# Patient Record
Sex: FEMALE | Race: WHITE | NOT HISPANIC OR LATINO | Employment: FULL TIME | URBAN - METROPOLITAN AREA
[De-identification: names, ages, dates, MRNs, and addresses within clinical notes are randomized per-mention and may not be internally consistent; named-entity substitution may affect disease eponyms.]

---

## 2023-04-18 ENCOUNTER — OFFICE VISIT (OUTPATIENT)
Dept: INTERNAL MEDICINE | Facility: CLINIC | Age: 38
End: 2023-04-18
Payer: COMMERCIAL

## 2023-04-18 VITALS
BODY MASS INDEX: 44.41 KG/M2 | RESPIRATION RATE: 16 BRPM | DIASTOLIC BLOOD PRESSURE: 86 MMHG | TEMPERATURE: 98.7 F | WEIGHT: 293 LBS | OXYGEN SATURATION: 98 % | SYSTOLIC BLOOD PRESSURE: 136 MMHG | HEIGHT: 68 IN | HEART RATE: 86 BPM

## 2023-04-18 DIAGNOSIS — E66.01 MORBID OBESITY WITH BMI OF 40.0-44.9, ADULT: ICD-10-CM

## 2023-04-18 DIAGNOSIS — F33.9 RECURRENT MAJOR DEPRESSIVE DISORDER, REMISSION STATUS UNSPECIFIED: Primary | ICD-10-CM

## 2023-04-18 RX ORDER — BUPROPION HYDROCHLORIDE 150 MG/1
150 TABLET ORAL DAILY
Qty: 30 TABLET | Refills: 5 | Status: SHIPPED | OUTPATIENT
Start: 2023-04-18

## 2023-04-18 RX ORDER — SEMAGLUTIDE 0.25 MG/.5ML
0.25 INJECTION, SOLUTION SUBCUTANEOUS WEEKLY
Qty: 2 ML | Refills: 0 | Status: SHIPPED | OUTPATIENT
Start: 2023-04-18

## 2023-04-18 NOTE — PROGRESS NOTES
Chief Complaint  Establish Care, Med Refill (Needs refills of Bupropion  MG, Metformin  MG/Getting IUD removed in  with hopes of getting pregnant), and Weight Loss (Discuss weight loss/Was going to start Wegovy .25 MG x 4 weeks(hasn't started yet))    Subjective        Alice Adams presents to [facility]  History of Present Illness  Here to establish care.     We discussed the followin. Depression - has bee on wellbutrin 150mg XL since post partum, some improvement in symptoms. Does have episodes of binging. No noted side effects  2. 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 Ongoing challenges with weight management. No personal or family history of thyroid cancer.       Current Outpatient Medications:   •  buPROPion XL (Wellbutrin XL) 150 MG 24 hr tablet, Take 1 tablet by mouth Daily., Disp: 30 tablet, Rfl: 5  •  Prenatal Vit-DSS-Fe Cbn-FA (PRENATAL MULTIVITAMIN-ULTRA PO), Take 1 tablet by mouth Daily., Disp: , Rfl:   •  Semaglutide-Weight Management (Wegovy) 0.25 MG/0.5ML solution auto-injector, Inject 0.25 mg under the skin into the appropriate area as directed 1 (One) Time Per Week., Disp: 2 mL, Rfl: 0  Past Medical History:   Diagnosis Date   • ADHD (attention deficit hyperactivity disorder)    • Depression    • History of medical problems    • Hypertension    • Obesity      History reviewed. No pertinent surgical history.  No family history on file.  Social History     Socioeconomic History   • Marital status:    Tobacco Use   • Smoking status: Never   • Smokeless tobacco: Never   Vaping Use   • Vaping Use: Never used   Substance and Sexual Activity   • Alcohol use: Yes   • Drug use: Never        reviewed and updated    Objective   Vital Signs:  /86    "Pulse 86   Temp 98.7 °F (37.1 °C)   Resp 16   Ht 172.7 cm (68\")   Wt 134 kg (295 lb 9.6 oz)   SpO2 98%   BMI 44.95 kg/m²   Estimated body mass index is 44.95 kg/m² as calculated from the following:    Height as of this encounter: 172.7 cm (68\").    Weight as of this encounter: 134 kg (295 lb 9.6 oz).    Class 3 Severe Obesity (BMI >=40). Obesity-related health conditions include the following: none. Obesity is unchanged. BMI is is above average; BMI management plan is completed. We discussed low calorie, low carb based diet program, portion control, increasing exercise, management of depression/anxiety/stress to control compensatory eating and pharmacologic options including glp1 agonist.      Physical Exam  Vitals and nursing note reviewed.   Constitutional:       General: She is not in acute distress.     Appearance: Normal appearance. She is not ill-appearing.   HENT:      Head: Normocephalic and atraumatic.      Right Ear: External ear normal.      Left Ear: External ear normal.   Eyes:      General:         Right eye: No discharge.         Left eye: No discharge.      Extraocular Movements: Extraocular movements intact.      Conjunctiva/sclera: Conjunctivae normal.      Pupils: Pupils are equal, round, and reactive to light.   Pulmonary:      Effort: Pulmonary effort is normal.   Neurological:      General: No focal deficit present.      Mental Status: She is alert and oriented to person, place, and time. Mental status is at baseline.      Cranial Nerves: No cranial nerve deficit.   Psychiatric:         Mood and Affect: Mood normal.         Behavior: Behavior normal.         Thought Content: Thought content normal.        Result Review :  The following data was reviewed by: Toño Vaughan MD on 04/18/2023:  Common labs        6/9/2022    12:29   Common Labs   Albumin 4.1        Total Bilirubin 0.4        Alkaline Phosphatase 96        AST (SGOT) 17        ALT (SGPT) 11        WBC 8.67        Hemoglobin 12.9   "      Hematocrit 41.2        Platelets 295            This result is from an external source.     Data reviewed: prior office notes reviewed          Assessment and Plan      Alice Adams is a 37 y.o. female presenting to [facility] care. MDD Stable on wellbutrin. Ongoing challenges with weight, discussed treatment options, risks/benefits/side effects/mechanism etc. Continue monitoring intake, increasing physical activity. F/u 4 weeks for dose titration and weight check.     Diagnoses and all orders for this visit:    1. Recurrent major depressive disorder, remission status unspecified (Primary)  -     buPROPion XL (Wellbutrin XL) 150 MG 24 hr tablet; Take 1 tablet by mouth Daily.  Dispense: 30 tablet; Refill: 5    2. Morbid obesity with BMI of 40.0-44.9, adult  -     Semaglutide-Weight Management (Wegovy) 0.25 MG/0.5ML solution auto-injector; Inject 0.25 mg under the skin into the appropriate area as directed 1 (One) Time Per Week.  Dispense: 2 mL; Refill: 0           I spent 20 minutes caring for Alice on this date of service. This time includes time spent by me in the following activities:preparing for the visit, reviewing tests, obtaining and/or reviewing a separately obtained history, performing a medically appropriate examination and/or evaluation , counseling and educating the patient/family/caregiver, ordering medications, tests, or procedures and documenting information in the medical record  Follow Up   Return in about 4 weeks (around 5/16/2023) for Recheck.  Patient was given instructions and counseling regarding her condition or for health maintenance advice. Please see specific information pulled into the AVS if appropriate.     Toño Vaughan MD  [facility]

## 2023-05-16 ENCOUNTER — TELEPHONE (OUTPATIENT)
Dept: INTERNAL MEDICINE | Facility: CLINIC | Age: 38
End: 2023-05-16

## 2023-05-16 NOTE — TELEPHONE ENCOUNTER
Caller: Alice Adams    Relationship: Self    Best call back number: [phone]    What medication are you requesting: INCREASED DOSAGE OF Semaglutide-Weight Management (Wegovy)    If a prescription is needed, what is your preferred pharmacy and phone number: [facility] [address] - [phone]  - [phone]     Additional notes: PATIENT TOOK LAST 0.25 MG INJECTION OF THE WEGOVY YESTERDAY. PATIENT STATES THAT SHE IS DOING OK WITH SIDE EFFECTS, ONLY COMPLAINT IS SOME NAUSEA.

## 2023-05-17 DIAGNOSIS — E66.01 MORBID OBESITY WITH BMI OF 40.0-44.9, ADULT: Primary | ICD-10-CM

## 2023-05-17 RX ORDER — SEMAGLUTIDE 0.5 MG/.5ML
0.5 INJECTION, SOLUTION SUBCUTANEOUS WEEKLY
Qty: 2 ML | Refills: 0 | Status: SHIPPED | OUTPATIENT
Start: 2023-05-17 | End: 2023-05-22

## 2023-05-22 ENCOUNTER — TELEPHONE (OUTPATIENT)
Dept: INTERNAL MEDICINE | Facility: CLINIC | Age: 38
End: 2023-05-22

## 2023-05-22 RX ORDER — SEMAGLUTIDE 0.25 MG/.5ML
0.25 INJECTION, SOLUTION SUBCUTANEOUS WEEKLY
Qty: 2 ML | Refills: 0 | Status: SHIPPED | OUTPATIENT
Start: 2023-05-22

## 2023-05-22 NOTE — TELEPHONE ENCOUNTER
Caller: Alice Adams    Relationship: Self    Best call back number: [phone]    Who are you requesting to speak with (clinical staff, provider,  specific staff member): DR. NORMAN    What was the call regarding: Semaglutide-Weight Management (Wegovy) 0.5 MG/0.5ML solution auto-injector  IS ON BACKORDER AT PHARMACY ASKING IF SHE CAN BE PRESCRIBED A DIFFERENT STRENGTH THEY HAVE 0.25 OR 0.75 MG IN STOCK     Do you require a callback: YES

## 2023-06-21 ENCOUNTER — TELEPHONE (OUTPATIENT)
Dept: INTERNAL MEDICINE | Facility: CLINIC | Age: 38
End: 2023-06-21

## 2023-06-21 NOTE — TELEPHONE ENCOUNTER
Caller: Alice Adams    Relationship: Self    Best call back number: [phone]     What is the best time to reach you: ANYTIME     Who are you requesting to speak with (clinical staff, provider,  specific staff member): CLINICAL    What was the call regarding: PATIENT IS CALLING IN WITH RIGHT KNEE PAIN. SHE WENT TO SQUAT DOWN AND FELT RIGHT KNEE POP AND SINCE THEN WHENEVER SHE BENDS THE KNEE SHE HAS STABBING PAIN GO THROUGH IT.     PATIENT IS ASKING IF DR NOMRAN WOULD WANT TO SEE HER OR SEND HER TO ORTHOPEDIC FOR FURTHER TREATMENT.     PLEASE CALL TO ADVISE.     Is it okay if the provider responds through MyChart: YES

## 2023-08-08 ENCOUNTER — TREATMENT (OUTPATIENT)
Dept: PHYSICAL THERAPY | Facility: CLINIC | Age: 38
End: 2023-08-08
Payer: COMMERCIAL

## 2023-08-08 DIAGNOSIS — R29.898 WEAKNESS OF RIGHT LOWER EXTREMITY: ICD-10-CM

## 2023-08-08 DIAGNOSIS — M25.561 ACUTE PAIN OF RIGHT KNEE: Primary | ICD-10-CM

## 2023-08-08 PROCEDURE — 97110 THERAPEUTIC EXERCISES: CPT | Performed by: PHYSICAL THERAPIST

## 2023-08-08 PROCEDURE — 97112 NEUROMUSCULAR REEDUCATION: CPT | Performed by: PHYSICAL THERAPIST

## 2023-08-08 PROCEDURE — 97116 GAIT TRAINING THERAPY: CPT | Performed by: PHYSICAL THERAPIST

## 2023-08-08 PROCEDURE — 97530 THERAPEUTIC ACTIVITIES: CPT | Performed by: PHYSICAL THERAPIST

## 2023-08-08 NOTE — PROGRESS NOTES
[facility] [place]  [address] [place] [address]  Phone [phone]  Fax [phone]      Physical Therapy Progress NOTE      Patient: Alice Adams   : 1985  Referring practitioner: Toño Vaughan MD  Date of Initial Visit: Type: THERAPY  Noted: 2023  Today's Date: 2023  Patient seen for 3 sessions including ROM, strengthening, and patient education.       Visit Diagnoses:    ICD-10-CM ICD-9-CM   1. Acute pain of right knee  M25.561 719.46   2. Weakness of right lower extremity  R29.898 729.89       Alice Adams reports: had 3  episodes of sharp pian with full flexion and lunge position. Overall no other issues with twisting.  Still feels like I'm babying it going down stairs.    Subjective     Objective          Observations     Right Knee   Negative for deformity and edema.     Additional Knee Observation Details       Tenderness     Right Knee   Tenderness in the patellar tendon.     Additional Tenderness Details  Mild inferior patella    Neurological Testing     Sensation     Knee   Left Knee   Intact: Light touch    Right Knee   Intact: light touch     Active Range of Motion   Left Knee   Flexion: 119 degrees   Extension: Left knee active extension: +3.     Right Knee   Flexion: 119 degrees   Extension: Right knee active extension: +1 hyperextension.     Passive Range of Motion   Left Knee   Flexion: 121 degrees   Extension: Left knee passive extension: +3.     Right Knee   Flexion: 120 degrees     Patellar Mobility   Left Knee Patellar tendons within functional limits include the medial, lateral, superior and inferior.     Right Knee Patellar tendons within functional limits include the medial and lateral. Hypermobile in the superior and inferior patellar tendon(s).     Additional Patellar Mobility Details  Slight hypermobility inferior and superior directions.    Strength/Myotome Testing     Left Hip   Planes of Motion   Flexion: 5  Extension:  "5  Abduction: 5  Adduction: 4    Right Hip   Planes of Motion   Flexion: 5  Extension: 5  Abduction: 5  Adduction: 4+    Left Knee   Flexion: 5  Extension: 5  Quadriceps contraction: good    Right Knee   Flexion: 4+  Prone flexion: 4+  Extension: 4 (5/5 in extension, 30 degrees, 4/5 in full flexion)  Quadriceps contraction: good    Tests     Right Knee   Negative anterior Lachman, Apley's compression, Apley's distraction, lateral Donald, medial Donald, patellar apprehension, patellar compression, patella-femoral grind, posterior Lachman, valgus stress test at 0 degrees, valgus stress test at 30 degrees, varus stress test at 0 degrees and varus stress test at 30 degrees.     Swelling     Left Knee Girth Measurement (cm)   Joint line: no.    Right Knee Girth Measurement (cm)   Joint line: no significant.    Ambulation     Quality of Movement During Gait     Knee    Knee (Left): Positive recurvatum.   Knee (Right): Positive recurvatum and valgus.     Functional Assessment   Squat No pain.     Single Leg Squat     Right Leg  Valgus.     Forward Step Up 8\"     Right Leg  No pain.     Forward Step Down 6\"     Right Leg  Pain and valgus.     Lunge   Left Leg  Within functional limits.     Right Leg  Pain.     Comments  Improved control in single leg stance but hyperextension.    See Exercise, Manual, and Modality Logs for complete treatment.   Access Code: [email or web address]  URL: [email or web address]  Date: 08/08/2023  Prepared by: Felicita Funes    Exercises  - Clam with Resistance  - 1 x daily - 7 x weekly - 1 sets - 15 reps - 3 hold  - Sidelying Hip Adduction with Ankle Weight - Leg Behind  - 1 x daily - 7 x weekly - 1 sets - 15 reps - - hold  - Supine Bridge with Mini Swiss Ball Between Knees  - 1 x daily - 7 x weekly - 1 sets - 10 reps - 10 hold  - Seated Quad Set  - 1 x daily - 7 x weekly - 2 sets - 10 reps - 10 hold  - Seated Long Arc Quad with Ankle Weight  - 1 x daily - 3 x weekly - 1 sets - 15 reps - 5 hold  - Hip " Abduction with Resistance Loop  - 1 x daily - 3 x weekly - 2 sets - 10 reps  - Seated Hamstring Curl with Anchored Resistance  - 1 x daily - 3 x weekly - 2 sets - 10 reps  - Alternating Single Leg Balance - Foot Behind  - 1 x daily - 3 x weekly - 3 sets - 1 reps - 30 hold  - Sit to Stand  - 1 x daily - 3 x weekly - 2 sets - 10 reps  - Forward Step Up  - 1 x daily - 3 x weekly - 2 sets - 10 reps  - Lateral Step Up with Counter Support  - 1 x daily - 3 x weekly - 3 sets - 10 reps  - Forward Step Down  - 1 x daily - 3 x weekly - 1 sets - 10 reps  - Single Leg Heel Raise with Counter Support  - 1 x daily - 3 x weekly - 1 sets - 10 reps    Assessment/Plan  Patient presents with decreased pain and instability overall however, she still has a sharp pain with a lunge/kneeling position when she bent down at home.  Side to side movements are no longer an issue but she still is apprehensive with descending the stairs.  She has full ROM and improved hip and knee strength.  Her weakness is now mostly in the end ranges of flexion and in weightbearing positions.  Tendon is mildly tender but appears intact with pain only recreated in extreme flexion with weightbearing.  We were able to progress exercises to more functional weightbearing with good tolerance and no pain.  She should still avoid positions that cause sharp pain and we will slowly progress as able.  If sharp pain persists after continued strengthening, we will refer her back to her physician.    Plan Goals: 3 weeks MET  1. Pt to tolerate initial HEP  2. Pt to exhibit increased right knee AROM to full to allow for improved ability to navigate curbs and stairs  3. Pt to report decreased pain with squat by 25%.  4. Pt to exhibit increased right strength by 1/5 to allow for prolonged ADL's and walking  8 weeks   1.  Patient will demonstrate full right knee ROM in open and closed chain. NOT MET  2.  Patient will demonstrate 5/5 strength bilateral knee and hip.  Progressing  3.  Functionally patient will be able to ascend/descend stairs, squat, and balance on one foot with no giving way, controlled movement, and  tolerable symptoms. Progressing  4.  Patient will be able to return to walking with tolerable symptoms. NOT MET    Timed:         Manual Therapy:    -     mins  57871;     Therapeutic Exercise:    15     mins  24026;     Neuromuscular Raul:    10    mins  70868;    Therapeutic Activity:     10     mins  83478;     Gait Training:      10   mins  34938;     Ultrasound:     -     mins  21989;    Ionto                               -    mins   52608  Self Care                       -     mins   13424  Orthotic training    -     mins 67257      Un-Timed:  Electrical Stimulation:    -     mins  60650 ( );  Dry Needling     -     mins self-pay  Traction     -     mins 35465      Timed Treatment:   50   mins   Total Treatment:     60   mins    Felicita Funes, PT, OCS, CIDN  [place] License: [id number]

## 2023-08-23 ENCOUNTER — TREATMENT (OUTPATIENT)
Dept: PHYSICAL THERAPY | Facility: CLINIC | Age: 38
End: 2023-08-23
Payer: COMMERCIAL

## 2023-08-23 DIAGNOSIS — M25.561 ACUTE PAIN OF RIGHT KNEE: Primary | ICD-10-CM

## 2023-08-23 DIAGNOSIS — R29.898 WEAKNESS OF RIGHT LOWER EXTREMITY: ICD-10-CM

## 2023-08-23 PROCEDURE — 97530 THERAPEUTIC ACTIVITIES: CPT | Performed by: PHYSICAL THERAPIST

## 2023-08-23 PROCEDURE — 97110 THERAPEUTIC EXERCISES: CPT | Performed by: PHYSICAL THERAPIST

## 2023-08-23 PROCEDURE — 97112 NEUROMUSCULAR REEDUCATION: CPT | Performed by: PHYSICAL THERAPIST

## 2023-08-23 NOTE — PROGRESS NOTES
[facility] [place]  [address] [place] [address]  Phone [phone]  Fax [phone]      Physical Therapy Daily Treatment Note      Patient: Alice Adams   : 1985  Referring practitioner: Toño Vaughan MD  Date of Initial Visit: Type: THERAPY  Noted: 2023  Today's Date: 2023  Patient seen for 4 sessions       Visit Diagnoses:    ICD-10-CM ICD-9-CM   1. Acute pain of right knee  M25.561 719.46   2. Weakness of right lower extremity  R29.898 729.89       Alice Adams reports: less chance to exercise the last week.  Did notice once incident of sharp pain with deep bend.  Pain is at the bottom of the knee cap.  No instability that she noticed.    Subjective     Objective   See Exercise, Manual, and Modality Logs for complete treatment.   No palpable tenderness or edema noted.  Improved step up and down on 8 inches with rotational control.  Full supine and prone knee flexion without pain.  Strength quad 4-/5 midrange to endrange 3+/5    Assessment/Plan  Patient presents with improved strength and control in both nonweightbearing and weightbearing positions.  Eccentric control has definitely improved with stepdown.  One episode of pain at end range weightbearing flexion appears just inferior patellar ligament with no indication of mechanical dysfunction of joint and ligament is intact.  Continue strengthening, may be ready for HEP next visit.    Timed:         Manual Therapy:    -     mins  74932;     Therapeutic Exercise:    15     mins  05160;     Neuromuscular Raul:    8   mins  85361;    Therapeutic Activity:     12     mins  82964;     Gait Training:      -     mins  26243;     Ultrasound:     -     mins  21056;    Ionto                               -    mins   71935  Self Care                       -     mins   56708  Orthotic training    -     mins 26806      Un-Timed:  Electrical Stimulation:    -     mins  29819 ( );  Dry Needling     -      mins self-pay  Traction     -     mins 90200      Timed Treatment:   35   mins   Total Treatment:     40  mins    Felicita Funes PT, CIDN  [place] License: [id number]

## 2024-03-19 ENCOUNTER — HOSPITAL ENCOUNTER (EMERGENCY)
Facility: HOSPITAL | Age: 39
Discharge: LEFT BEFORE DISCHARGE | End: 2024-03-19
Attending: EMERGENCY MEDICINE | Admitting: EMERGENCY MEDICINE
Payer: COMMERCIAL

## 2024-03-19 ENCOUNTER — HOSPITAL ENCOUNTER (EMERGENCY)
Facility: HOSPITAL | Age: 39
Discharge: HOME | End: 2024-03-19
Attending: EMERGENCY MEDICINE | Admitting: EMERGENCY MEDICINE
Payer: COMMERCIAL

## 2024-03-19 VITALS
RESPIRATION RATE: 16 BRPM | HEART RATE: 84 BPM | SYSTOLIC BLOOD PRESSURE: 145 MMHG | DIASTOLIC BLOOD PRESSURE: 90 MMHG | OXYGEN SATURATION: 97 % | TEMPERATURE: 98.4 F | WEIGHT: 293 LBS | HEIGHT: 68 IN | BODY MASS INDEX: 44.41 KG/M2

## 2024-03-19 VITALS
BODY MASS INDEX: 44.41 KG/M2 | SYSTOLIC BLOOD PRESSURE: 148 MMHG | WEIGHT: 293 LBS | HEIGHT: 68 IN | DIASTOLIC BLOOD PRESSURE: 99 MMHG | OXYGEN SATURATION: 96 % | RESPIRATION RATE: 18 BRPM | TEMPERATURE: 98.4 F | HEART RATE: 90 BPM

## 2024-03-19 DIAGNOSIS — I10 HYPERTENSION, UNSPECIFIED TYPE: Primary | ICD-10-CM

## 2024-03-19 DIAGNOSIS — R19.7 NAUSEA VOMITING AND DIARRHEA: ICD-10-CM

## 2024-03-19 DIAGNOSIS — R11.2 NAUSEA VOMITING AND DIARRHEA: ICD-10-CM

## 2024-03-19 DIAGNOSIS — N30.01 ACUTE CYSTITIS WITH HEMATURIA: ICD-10-CM

## 2024-03-19 PROBLEM — F98.8 ADD (ATTENTION DEFICIT DISORDER): Status: ACTIVE | Noted: 2017-02-14

## 2024-03-19 PROBLEM — Z97.5 IUD (INTRAUTERINE DEVICE) IN PLACE: Status: ACTIVE | Noted: 2021-01-13

## 2024-03-19 PROBLEM — Z82.79 FAMILY HISTORY OF SPINA BIFIDA: Status: ACTIVE | Noted: 2017-02-14

## 2024-03-19 PROBLEM — Z86.59 HISTORY OF DEPRESSION: Status: ACTIVE | Noted: 2017-02-14

## 2024-03-19 PROBLEM — O14.93 PRE-ECLAMPSIA IN THIRD TRIMESTER: Status: ACTIVE | Noted: 2020-10-28

## 2024-03-19 PROBLEM — Z80.3 FAMILY HISTORY OF BREAST CANCER: Status: ACTIVE | Noted: 2017-02-14

## 2024-03-19 PROBLEM — Z83.3 FAMILY HISTORY OF DIABETES MELLITUS: Status: ACTIVE | Noted: 2017-02-14

## 2024-03-19 LAB
ALBUMIN SERPL-MCNC: 4 G/DL (ref 3.5–5.2)
ALBUMIN/GLOB SERPL: 1.4 G/DL
ALP SERPL-CCNC: 114 U/L (ref 39–117)
ALT SERPL W P-5'-P-CCNC: 23 U/L (ref 1–33)
ANION GAP SERPL CALCULATED.3IONS-SCNC: 13 MMOL/L (ref 5–15)
AST SERPL-CCNC: 18 U/L (ref 1–32)
BACTERIA UR QL AUTO: ABNORMAL /HPF
BASOPHILS # BLD AUTO: 0.03 10*3/MM3 (ref 0–0.2)
BASOPHILS NFR BLD AUTO: 0.3 % (ref 0–1.5)
BILIRUB SERPL-MCNC: 0.2 MG/DL (ref 0–1.2)
BILIRUB UR QL STRIP: NEGATIVE
BUN SERPL-MCNC: 14 MG/DL (ref 6–20)
BUN/CREAT SERPL: 18.4 (ref 7–25)
CALCIUM SPEC-SCNC: 9 MG/DL (ref 8.6–10.5)
CHLORIDE SERPL-SCNC: 100 MMOL/L (ref 98–107)
CLARITY UR: ABNORMAL
CO2 SERPL-SCNC: 25 MMOL/L (ref 22–29)
COLOR UR: YELLOW
CREAT SERPL-MCNC: 0.76 MG/DL (ref 0.57–1)
DEPRECATED RDW RBC AUTO: 44.2 FL (ref 37–54)
EGFRCR SERPLBLD CKD-EPI 2021: 104.3 ML/MIN/1.73
EOSINOPHIL # BLD AUTO: 0.15 10*3/MM3 (ref 0–0.4)
EOSINOPHIL NFR BLD AUTO: 1.6 % (ref 0.3–6.2)
ERYTHROCYTE [DISTWIDTH] IN BLOOD BY AUTOMATED COUNT: 14.5 % (ref 12.3–15.4)
GLOBULIN UR ELPH-MCNC: 2.9 GM/DL
GLUCOSE SERPL-MCNC: 98 MG/DL (ref 65–99)
GLUCOSE UR STRIP-MCNC: NEGATIVE MG/DL
HCG SERPL QL: NEGATIVE
HCT VFR BLD AUTO: 44.9 % (ref 34–46.6)
HGB BLD-MCNC: 14.5 G/DL (ref 12–15.9)
HGB UR QL STRIP.AUTO: ABNORMAL
HOLD SPECIMEN: NORMAL
HYALINE CASTS UR QL AUTO: ABNORMAL /LPF
IMM GRANULOCYTES # BLD AUTO: 0.05 10*3/MM3 (ref 0–0.05)
IMM GRANULOCYTES NFR BLD AUTO: 0.5 % (ref 0–0.5)
KETONES UR QL STRIP: NEGATIVE
LEUKOCYTE ESTERASE UR QL STRIP.AUTO: ABNORMAL
LIPASE SERPL-CCNC: 31 U/L (ref 13–60)
LYMPHOCYTES # BLD AUTO: 1.56 10*3/MM3 (ref 0.7–3.1)
LYMPHOCYTES NFR BLD AUTO: 16.7 % (ref 19.6–45.3)
MAGNESIUM SERPL-MCNC: 1.9 MG/DL (ref 1.6–2.6)
MCH RBC QN AUTO: 27.3 PG (ref 26.6–33)
MCHC RBC AUTO-ENTMCNC: 32.3 G/DL (ref 31.5–35.7)
MCV RBC AUTO: 84.6 FL (ref 79–97)
MONOCYTES # BLD AUTO: 0.54 10*3/MM3 (ref 0.1–0.9)
MONOCYTES NFR BLD AUTO: 5.8 % (ref 5–12)
NEUTROPHILS NFR BLD AUTO: 6.99 10*3/MM3 (ref 1.7–7)
NEUTROPHILS NFR BLD AUTO: 75.1 % (ref 42.7–76)
NITRITE UR QL STRIP: NEGATIVE
NRBC BLD AUTO-RTO: 0 /100 WBC (ref 0–0.2)
PH UR STRIP.AUTO: 5.5 [PH] (ref 4.5–8)
PHOSPHATE SERPL-MCNC: 3.4 MG/DL (ref 2.5–4.5)
PLATELET # BLD AUTO: 315 10*3/MM3 (ref 140–450)
PMV BLD AUTO: 10.8 FL (ref 6–12)
POTASSIUM SERPL-SCNC: 3.7 MMOL/L (ref 3.5–5.2)
PROT SERPL-MCNC: 6.9 G/DL (ref 6–8.5)
PROT UR QL STRIP: NEGATIVE
RBC # BLD AUTO: 5.31 10*6/MM3 (ref 3.77–5.28)
RBC # UR STRIP: ABNORMAL /HPF
REF LAB TEST METHOD: ABNORMAL
SODIUM SERPL-SCNC: 138 MMOL/L (ref 136–145)
SP GR UR STRIP: 1.03 (ref 1–1.03)
SQUAMOUS #/AREA URNS HPF: ABNORMAL /HPF
T4 FREE SERPL-MCNC: 1.11 NG/DL (ref 0.93–1.7)
TSH SERPL DL<=0.05 MIU/L-ACNC: 0.95 UIU/ML (ref 0.27–4.2)
UROBILINOGEN UR QL STRIP: ABNORMAL
WBC # UR STRIP: ABNORMAL /HPF
WBC NRBC COR # BLD AUTO: 9.32 10*3/MM3 (ref 3.4–10.8)

## 2024-03-19 PROCEDURE — 81001 URINALYSIS AUTO W/SCOPE: CPT | Performed by: NURSE PRACTITIONER

## 2024-03-19 PROCEDURE — 99283 EMERGENCY DEPT VISIT LOW MDM: CPT

## 2024-03-19 PROCEDURE — 84100 ASSAY OF PHOSPHORUS: CPT | Performed by: NURSE PRACTITIONER

## 2024-03-19 PROCEDURE — 83735 ASSAY OF MAGNESIUM: CPT | Performed by: NURSE PRACTITIONER

## 2024-03-19 PROCEDURE — 83690 ASSAY OF LIPASE: CPT | Performed by: NURSE PRACTITIONER

## 2024-03-19 PROCEDURE — 84703 CHORIONIC GONADOTROPIN ASSAY: CPT | Performed by: NURSE PRACTITIONER

## 2024-03-19 PROCEDURE — 80050 GENERAL HEALTH PANEL: CPT | Performed by: NURSE PRACTITIONER

## 2024-03-19 PROCEDURE — 84439 ASSAY OF FREE THYROXINE: CPT | Performed by: NURSE PRACTITIONER

## 2024-03-19 PROCEDURE — 87086 URINE CULTURE/COLONY COUNT: CPT | Performed by: NURSE PRACTITIONER

## 2024-03-19 RX ORDER — NITROFURANTOIN 25; 75 MG/1; MG/1
100 CAPSULE ORAL 2 TIMES DAILY
Qty: 14 CAPSULE | Refills: 0 | Status: SHIPPED | OUTPATIENT
Start: 2024-03-19 | End: 2024-03-26

## 2024-03-19 RX ORDER — ONDANSETRON 4 MG/1
4 TABLET, ORALLY DISINTEGRATING ORAL 4 TIMES DAILY PRN
Qty: 12 TABLET | Refills: 0 | Status: SHIPPED | OUTPATIENT
Start: 2024-03-19

## 2024-03-19 RX ORDER — LISINOPRIL 10 MG/1
10 TABLET ORAL DAILY
Qty: 30 TABLET | Refills: 0 | Status: SHIPPED | OUTPATIENT
Start: 2024-03-19 | End: 2024-04-18

## 2024-03-19 NOTE — DISCHARGE INSTRUCTIONS
Rest and increase fluids.  Follow-up with your primary care.  Take medications as directed.  Return to the emergency department symptoms get worse or change.

## 2024-03-19 NOTE — ED PROVIDER NOTES
"Subjective   History of Present Illness  38-year-old  morbidly obese female patient presented to the emergency department via private vehicle with a chief complaint of hypertension.  Patient states that she took her blood pressure at home and it was elevated.  She states she went to urgent care and they evaluated her and sent her to the emergency department.  She states that she does have a history of elevated blood pressure but it was in the context of her pregnancy in 2020.  She states she did have preeclampsia and delivered approximately 1-1/2-week early.  She states she did have some issues with her blood pressure after delivery but it did finally \"settle down\".  Patient states she is having some neck pain and diarrhea.  She states overall she does not feel \"well\".    History provided by:  Medical records and patient      Review of Systems   Constitutional: Negative.    HENT: Negative.     Respiratory: Negative.     Cardiovascular: Negative.    Gastrointestinal:  Positive for diarrhea.   Genitourinary: Negative.    Musculoskeletal:  Positive for neck pain.   Skin: Negative.    Neurological: Negative.    Psychiatric/Behavioral: Negative.         Past Medical History:   Diagnosis Date    ADHD (attention deficit hyperactivity disorder)     Anemia 2017 and 2020    postpartum    Depression     Gestational hypertension 2017 and 2020    History of medical problems     Hypertension     Migraine 2008 occasional since    Obesity     PMS (premenstrual syndrome) normal    Pre-eclampsia in third trimester 10/28/2020    Urinary tract infection 2005 constantly    Varicella as a child very severe       No Known Allergies    Past Surgical History:   Procedure Laterality Date    WISDOM TOOTH EXTRACTION  2003       Family History   Problem Relation Age of Onset    Breast cancer Mother         BRCA -    Melanoma Father     Stroke Father         has had 2 strokes    Diabetes Father     Hypertension Father     Stroke " Maternal Grandfather         I believe he  of a stroke    Diabetes Maternal Grandfather     Diabetes Maternal Grandmother     Prostate cancer Paternal Grandfather     Diabetes Paternal Grandfather     Hypertension Paternal Grandfather     Ovarian cancer Paternal Grandmother     Breast cancer Paternal Aunt         BRCA +    Breast cancer Paternal Aunt         BRCA +    Uterine cancer Paternal Aunt     Hypertension Paternal Uncle        Social History     Socioeconomic History    Marital status:    Tobacco Use    Smoking status: Never     Passive exposure: Never    Smokeless tobacco: Never   Vaping Use    Vaping status: Never Used   Substance and Sexual Activity    Alcohol use: Not Currently    Drug use: Never    Sexual activity: Yes     Partners: Male     Birth control/protection: I.U.D.           Objective   Physical Exam  Vitals and nursing note reviewed.   Constitutional:       General: She is awake. She is not in acute distress.     Appearance: Normal appearance. She is morbidly obese. She is not ill-appearing, toxic-appearing or diaphoretic.   HENT:      Head: Normocephalic and atraumatic.      Right Ear: External ear normal.      Left Ear: External ear normal.      Nose: Nose normal.      Mouth/Throat:      Mouth: Mucous membranes are moist.      Pharynx: Oropharynx is clear.   Eyes:      Extraocular Movements: Extraocular movements intact.      Conjunctiva/sclera: Conjunctivae normal.      Pupils: Pupils are equal, round, and reactive to light.   Cardiovascular:      Rate and Rhythm: Normal rate and regular rhythm.      Pulses: Normal pulses.      Heart sounds: Normal heart sounds.   Pulmonary:      Effort: Pulmonary effort is normal.      Breath sounds: Normal breath sounds.   Abdominal:      General: Bowel sounds are normal.      Palpations: Abdomen is soft.   Musculoskeletal:         General: Normal range of motion.      Cervical back: Normal range of motion and neck supple.   Skin:     General:  Skin is warm and dry.      Capillary Refill: Capillary refill takes less than 2 seconds.   Neurological:      General: No focal deficit present.      Mental Status: She is alert and oriented to person, place, and time.   Psychiatric:         Mood and Affect: Mood normal.         Behavior: Behavior normal. Behavior is cooperative.         Thought Content: Thought content normal.         Judgment: Judgment normal.   Procedures           ED Course                                             Medical Decision Making  Differential diagnosis includes essential hypertension, hypertensive crisis, electrolyte abnormalities, renal dysfunction, congestive heart failure and gastroenteritis.    Results for orders placed or performed during the hospital encounter of 03/19/24  -[place] Urine Culture Tube - Urine, Clean Catch:   Specimen: Urine, Clean Catch       Result                      Value             Ref Range           Extra Tube                                                    Hold for add-ons.  -Urinalysis, Microscopic Only - Urine, Clean Catch:   Specimen: Urine, Clean Catch       Result                      Value             Ref Range           RBC, UA                     3-5 (A)           None Seen, 0*       WBC, UA                     6-10 (A)          None Seen, 0*       Bacteria, UA                3+ (A)            None Seen /H*       Squamous Epithelial Ce*     13-20 (A)         None Seen, 0*       Hyaline Casts, UA           None Seen         None Seen /L*       Methodology                                                   Manual Light Microscopy  -Urinalysis With Microscopic If Indicated (No Culture) - Urine, Clean Catch:   Specimen: Urine, Clean Catch       Result                      Value             Ref Range           Color, UA                   Yellow            Yellow, Straw       Appearance, UA                                Clear           Slightly Cloudy (A)       pH, UA                      5.5                4.5 - 8.0           Specific Economy, UA        1.030             1.003 - 1.030       Glucose, UA                 Negative          Negative            Ketones, UA                 Negative          Negative            Bilirubin, UA               Negative          Negative            Blood, UA                   Trace (A)         Negative            Protein, UA                 Negative          Negative            Leuk Esterase, UA           Trace (A)         Negative            Nitrite, UA                 Negative          Negative            Urobilinogen, UA            0.2 E.U./dL       0.2 - 1.0 E.*  Results for orders placed or performed during the hospital encounter of 03/19/24  -CBC Auto Differential:   Specimen: Blood       Result                      Value             Ref Range           WBC                         9.32              3.40 - 10.80*       RBC                         5.31 (H)          3.77 - 5.28 *       Hemoglobin                  14.5              12.0 - 15.9 *       Hematocrit                  44.9              34.0 - 46.6 %       MCV                         84.6              79.0 - 97.0 *       MCH                         27.3              26.6 - 33.0 *       MCHC                        32.3              31.5 - 35.7 *       RDW                         14.5              12.3 - 15.4 %       RDW-SD                      44.2              37.0 - 54.0 *       MPV                         10.8              6.0 - 12.0 fL       Platelets                   315               140 - 450 10*       Neutrophil %                75.1              42.7 - 76.0 %       Lymphocyte %                16.7 (L)          19.6 - 45.3 %       Monocyte %                  5.8               5.0 - 12.0 %        Eosinophil %                1.6               0.3 - 6.2 %         Basophil %                  0.3               0.0 - 1.5 %         Immature Grans %            0.5               0.0 - 0.5 %         Neutrophils,  Absolute       6.99              1.70 - 7.00 *       Lymphocytes, Absolute       1.56              0.70 - 3.10 *       Monocytes, Absolute         0.54              0.10 - 0.90 *       Eosinophils, Absolute       0.15              0.00 - 0.40 *       Basophils, Absolute         0.03              0.00 - 0.20 *       Immature Grans, Absolu*     0.05              0.00 - 0.05 *       nRBC                        0.0               0.0 - 0.2 /1*  -hCG, Serum, Qualitative:   Specimen: Blood       Result                      Value             Ref Range           HCG Qualitative             Negative          Negative       -TSH:   Specimen: Blood       Result                      Value             Ref Range           TSH                         0.954             0.270 - 4.20*  -T4, Free:   Specimen: Blood       Result                      Value             Ref Range           Free T4                     1.11              0.93 - 1.70 *  -Phosphorus:   Specimen: Blood       Result                      Value             Ref Range           Phosphorus                  3.4               2.5 - 4.5 mg*  -Magnesium:   Specimen: Blood       Result                      Value             Ref Range           Magnesium                   1.9               1.6 - 2.6 mg*  -Lipase:   Specimen: Blood       Result                      Value             Ref Range           Lipase                      31                13 - 60 U/L    -Comprehensive Metabolic Panel:   Specimen: Blood       Result                      Value             Ref Range           Glucose                     98                65 - 99 mg/dL       BUN                         14                6 - 20 mg/dL        Creatinine                  0.76              0.57 - 1.00 *       Sodium                      138               136 - 145 mm*       Potassium                   3.7               3.5 - 5.2 mm*       Chloride                    100               98 - 107 mmo*       CO2                          25.0              22.0 - 29.0 *       Calcium                     9.0               8.6 - 10.5 m*       Total Protein               6.9               6.0 - 8.5 g/*       Albumin                     4.0               3.5 - 5.2 g/*       ALT (SGPT)                  23                1 - 33 U/L          AST (SGOT)                  18                1 - 32 U/L          Alkaline Phosphatase        114               39 - 117 U/L        Total Bilirubin             0.2               0.0 - 1.2 mg*       Globulin                    2.9               gm/dL               A/G Ratio                   1.4               g/dL                BUN/Creatinine Ratio        18.4              7.0 - 25.0          Anion Gap                   13.0              5.0 - 15.0 m*       eGFR                        104.3             >60.0 mL/min*  Results for orders placed or performed in visit on 07/11/23  -IGP, Apt HPV,rfx 16 / 18,45:   Specimen: Cervix; ThinPrep Vial       Result                      Value             Ref Range           Diagnosis                   Comment                               Specimen adequacy:          Comment                               Clinician Provided ICD*     Comment                               Performed by:               Comment                               .                           .                                     Note:                       Comment                               Method:                     Comment                               HPV Aptima                  Negative          Negative       -POC Pregnancy, Urine:   Specimen: Urine       Result                      Value             Ref Range           HCG, Urine, QL              Negative          Negative            Lot Number                  [id number]                            Internal Positive Cont*     Passed            Positive, Pa*       Internal Negative Cont*     Passed            Negative, Pa*        Expiration Date             01/31/24                         -POC Urinalysis Dipstick:   Specimen: Urine       Result                      Value             Ref Range           Color                       Yellow            Yellow, Stra*       Clarity, UA                 Clear             Clear               Glucose, UA                 Negative          Negative mg/*       Bilirubin                   Negative          Negative            Ketones, UA                 Negative          Negative            Specific Gravity            1.005             1.005 - 1.030       Blood, UA                   Negative          Negative            pH, Urine                   5.0               5.0 - 8.0           Protein, POC                Negative          Negative mg/*       Urobilinogen, UA            Normal            Normal, 0.2 *       Leukocytes                  Negative          Negative            Nitrite, UA                 Negative          Negative       Results for orders placed or performed during the hospital encounter of 12/30/22  -Covid-19 + Flu A&B AG, Veritor [id number]:   Specimen: Swab       Result                      Value             Ref Range           COVID19                     Not Detected                          Influenza A Antigen HOLLY     Not Detected                          Influenza B Antigen HOLLY     Not Detected                          Internal Control            Passed                                Lot Number                  [id number]                             Expiration Date             [id number],[id number]                           Discussed laboratory findings with patient.  Patient has hypertension.  Laboratory studies are all negative.  Patient appears to have an acute cystitis with hematuria as well.  We will send in Macrobid 100 mg by mouth twice daily, lisinopril 10 mg by mouth daily and Zofran 4 mg every 6 hours as needed.  She was instructed to follow-up with her primary care.  Patient  understands and agrees to discharge plan.    Problems Addressed:  Acute cystitis with hematuria: acute illness or injury  Hypertension, unspecified type: acute illness or injury  Nausea vomiting and diarrhea: acute illness or injury    Amount and/or Complexity of Data Reviewed  External Data Reviewed: labs, radiology and notes.  Labs: ordered. Decision-making details documented in ED Course.    Risk  Prescription drug management.        Final diagnoses:   Hypertension, unspecified type   Acute cystitis with hematuria   Nausea vomiting and diarrhea       ED Disposition  ED Disposition       ED Disposition   Discharge    Condition   Stable    Comment   --               Toño Vaughan MD  [address]  [facility] [address] [phone]    Schedule an appointment as soon as possible for a visit in 1 week      [facility] EMERGENCY DEPARTMENT  [address]   [place] [address] [phone]    If symptoms worsen         Medication List        New Prescriptions      lisinopril 10 MG tablet  Commonly known as: PRINIVIL,ZESTRIL  Take 1 tablet by mouth Daily for 30 days.     nitrofurantoin (macrocrystal-monohydrate) 100 MG capsule  Commonly known as: MACROBID  Take 1 capsule by mouth 2 (Two) Times a Day for 7 days.     ondansetron ODT 4 MG disintegrating tablet  Commonly known as: ZOFRAN-ODT  Place 1 tablet on the tongue 4 (Four) Times a Day As Needed for Nausea or Vomiting.               Where to Get Your Medications        These medications were sent to [id number] - [place] - [address] - [phone]  - [phone] FX  [address] [place] [address]      Phone: [phone]   lisinopril 10 MG tablet  nitrofurantoin (macrocrystal-monohydrate) 100 MG capsule  ondansetron ODT 4 MG disintegrating tablet            Aj Jenkins, APRN  03/19/24 2660

## 2024-03-19 NOTE — ED NOTES
Pt states she has no one to help p/u her pre-schooler and has to leave to pick her up by 1700 and will come back.

## 2024-03-19 NOTE — ED PROVIDER NOTES
"Subjective   History of Present Illness  38-year-old  morbidly obese female patient presented to the emergency department via private vehicle with a chief complaint of hypertension.  Patient states that she took her blood pressure at home and it was elevated.  She states she went to urgent care and they evaluated her and sent her to the emergency department.  She states that she does have a history of elevated blood pressure but it was in the context of her pregnancy in 2020.  She states she did have preeclampsia and delivered approximately 1-1/2-week early.  She states she did have some issues with her blood pressure after delivery but it did finally \"settle down\".  Patient states she is having some neck pain and diarrhea.  She states overall she does not feel \"well\".    History provided by:  Medical records and patient      Review of Systems   Constitutional: Negative.    HENT: Negative.     Eyes: Negative.    Respiratory: Negative.     Cardiovascular: Negative.    Gastrointestinal:  Positive for diarrhea. Negative for abdominal distention, abdominal pain, anal bleeding, blood in stool, constipation, nausea, rectal pain and vomiting.   Genitourinary: Negative.    Musculoskeletal:  Positive for neck pain. Negative for arthralgias, back pain, gait problem, joint swelling, myalgias and neck stiffness.   Skin: Negative.    Neurological: Negative.    Psychiatric/Behavioral: Negative.     All other systems reviewed and are negative.      Past Medical History:   Diagnosis Date    ADHD (attention deficit hyperactivity disorder)     Anemia 2017 and 2020    postpartum    Depression     Gestational hypertension 2017 and 2020    History of medical problems     Hypertension     Migraine 2008 occasional since    Obesity     PMS (premenstrual syndrome) normal    Pre-eclampsia in third trimester 10/28/2020    Urinary tract infection 2005 constantly    Varicella as a child very severe       No Known Allergies    Past " Surgical History:   Procedure Laterality Date    WISDOM TOOTH EXTRACTION         Family History   Problem Relation Age of Onset    Breast cancer Mother         BRCA -    Melanoma Father     Stroke Father         has had 2 strokes    Diabetes Father     Hypertension Father     Stroke Maternal Grandfather         I believe he  of a stroke    Diabetes Maternal Grandfather     Diabetes Maternal Grandmother     Prostate cancer Paternal Grandfather     Diabetes Paternal Grandfather     Hypertension Paternal Grandfather     Ovarian cancer Paternal Grandmother     Breast cancer Paternal Aunt         BRCA +    Breast cancer Paternal Aunt         BRCA +    Uterine cancer Paternal Aunt     Hypertension Paternal Uncle        Social History     Socioeconomic History    Marital status:    Tobacco Use    Smoking status: Never     Passive exposure: Never    Smokeless tobacco: Never   Vaping Use    Vaping status: Never Used   Substance and Sexual Activity    Alcohol use: Not Currently    Drug use: Never    Sexual activity: Yes     Partners: Male     Birth control/protection: I.U.D.           Objective   Physical Exam  Vitals and nursing note reviewed.   Constitutional:       General: She is awake. She is not in acute distress.     Appearance: Normal appearance. She is morbidly obese. She is not ill-appearing, toxic-appearing or diaphoretic.   HENT:      Head: Normocephalic and atraumatic.      Right Ear: External ear normal.      Left Ear: External ear normal.      Nose: Nose normal.      Mouth/Throat:      Mouth: Mucous membranes are moist.      Pharynx: Oropharynx is clear.   Eyes:      Extraocular Movements: Extraocular movements intact.      Conjunctiva/sclera: Conjunctivae normal.      Pupils: Pupils are equal, round, and reactive to light.   Cardiovascular:      Rate and Rhythm: Normal rate and regular rhythm.      Pulses: Normal pulses.      Heart sounds: Normal heart sounds.   Pulmonary:      Effort: Pulmonary  effort is normal.      Breath sounds: Normal breath sounds.   Abdominal:      General: Bowel sounds are normal.      Palpations: Abdomen is soft.   Musculoskeletal:         General: Normal range of motion.      Cervical back: Normal range of motion and neck supple.   Skin:     General: Skin is warm and dry.      Capillary Refill: Capillary refill takes less than 2 seconds.   Neurological:      General: No focal deficit present.      Mental Status: She is alert and oriented to person, place, and time.   Psychiatric:         Mood and Affect: Mood normal.         Behavior: Behavior normal. Behavior is cooperative.         Thought Content: Thought content normal.         Judgment: Judgment normal.         Procedures           ED Course                                             Medical Decision Making  Differential diagnosis includes essential hypertension, hypertensive crisis, electrolyte abnormalities, renal dysfunction, congestive heart failure and gastroenteritis.    Problems Addressed:  Hypertension, unspecified type: complicated acute illness or injury    Amount and/or Complexity of Data Reviewed  Labs: ordered. Decision-making details documented in ED Course.        Final diagnoses:   None       ED Disposition  ED Disposition       None            No follow-up provider specified.       Medication List      No changes were made to your prescriptions during this visit.            Aj Jenkins, APRN  03/19/24 1930

## 2024-03-20 ENCOUNTER — PATIENT ROUNDING (BHMG ONLY) (OUTPATIENT)
Dept: URGENT CARE | Facility: CLINIC | Age: 39
End: 2024-03-20
Payer: COMMERCIAL

## 2024-03-20 NOTE — ED NOTES
Thank you for letting us care for you in your recent visit to our urgent care center. We would love to hear about your experience with us. Was this the first time you have visited our location?    We’re always looking for ways to make our patients’ experiences even better. Do you have any recommendations on ways we may improve?     I appreciate you taking the time to respond. Please be on the lookout for a survey about your recent visit from [employer] via text or email. We would greatly appreciate if you could fill that out and turn it back in. We want your voice to be heard and we value your feedback.   Thank you for choosing [facility] for your healthcare needs.

## 2024-03-21 LAB — BACTERIA SPEC AEROBE CULT: NORMAL

## 2024-03-30 ENCOUNTER — HOSPITAL ENCOUNTER (EMERGENCY)
Facility: HOSPITAL | Age: 39
Discharge: HOME | End: 2024-03-30
Attending: STUDENT IN AN ORGANIZED HEALTH CARE EDUCATION/TRAINING PROGRAM
Payer: COMMERCIAL

## 2024-03-30 VITALS
DIASTOLIC BLOOD PRESSURE: 93 MMHG | WEIGHT: 293 LBS | BODY MASS INDEX: 44.41 KG/M2 | TEMPERATURE: 98.2 F | OXYGEN SATURATION: 95 % | SYSTOLIC BLOOD PRESSURE: 119 MMHG | HEIGHT: 68 IN | HEART RATE: 90 BPM | RESPIRATION RATE: 18 BRPM

## 2024-03-30 DIAGNOSIS — R19.7 DIARRHEA, UNSPECIFIED TYPE: ICD-10-CM

## 2024-03-30 DIAGNOSIS — R11.2 NAUSEA AND VOMITING, UNSPECIFIED VOMITING TYPE: Primary | ICD-10-CM

## 2024-03-30 LAB
ALBUMIN SERPL-MCNC: 4.2 G/DL (ref 3.5–5.2)
ALBUMIN/GLOB SERPL: 1.6 G/DL
ALP SERPL-CCNC: 116 U/L (ref 39–117)
ALT SERPL W P-5'-P-CCNC: 53 U/L (ref 1–33)
ANION GAP SERPL CALCULATED.3IONS-SCNC: 13.1 MMOL/L (ref 5–15)
AST SERPL-CCNC: 35 U/L (ref 1–32)
BASOPHILS # BLD AUTO: 0.02 10*3/MM3 (ref 0–0.2)
BASOPHILS NFR BLD AUTO: 0.3 % (ref 0–1.5)
BILIRUB SERPL-MCNC: 0.2 MG/DL (ref 0–1.2)
BILIRUB UR QL STRIP: NEGATIVE
BUN SERPL-MCNC: 12 MG/DL (ref 6–20)
BUN/CREAT SERPL: 15.8 (ref 7–25)
CALCIUM SPEC-SCNC: 8.2 MG/DL (ref 8.6–10.5)
CHLORIDE SERPL-SCNC: 102 MMOL/L (ref 98–107)
CLARITY UR: CLEAR
CO2 SERPL-SCNC: 18.9 MMOL/L (ref 22–29)
COLOR UR: YELLOW
CREAT SERPL-MCNC: 0.76 MG/DL (ref 0.57–1)
DEPRECATED RDW RBC AUTO: 43.6 FL (ref 37–54)
EGFRCR SERPLBLD CKD-EPI 2021: 104.3 ML/MIN/1.73
EOSINOPHIL # BLD AUTO: 0.11 10*3/MM3 (ref 0–0.4)
EOSINOPHIL NFR BLD AUTO: 1.8 % (ref 0.3–6.2)
ERYTHROCYTE [DISTWIDTH] IN BLOOD BY AUTOMATED COUNT: 14.6 % (ref 12.3–15.4)
GLOBULIN UR ELPH-MCNC: 2.6 GM/DL
GLUCOSE SERPL-MCNC: 89 MG/DL (ref 65–99)
GLUCOSE UR STRIP-MCNC: NEGATIVE MG/DL
HCG SERPL QL: NEGATIVE
HCT VFR BLD AUTO: 42.8 % (ref 34–46.6)
HGB BLD-MCNC: 14.2 G/DL (ref 12–15.9)
HGB UR QL STRIP.AUTO: NEGATIVE
IMM GRANULOCYTES # BLD AUTO: 0.02 10*3/MM3 (ref 0–0.05)
IMM GRANULOCYTES NFR BLD AUTO: 0.3 % (ref 0–0.5)
KETONES UR QL STRIP: NEGATIVE
LEUKOCYTE ESTERASE UR QL STRIP.AUTO: NEGATIVE
LIPASE SERPL-CCNC: 26 U/L (ref 13–60)
LYMPHOCYTES # BLD AUTO: 1.25 10*3/MM3 (ref 0.7–3.1)
LYMPHOCYTES NFR BLD AUTO: 21 % (ref 19.6–45.3)
MCH RBC QN AUTO: 27.6 PG (ref 26.6–33)
MCHC RBC AUTO-ENTMCNC: 33.2 G/DL (ref 31.5–35.7)
MCV RBC AUTO: 83.3 FL (ref 79–97)
MONOCYTES # BLD AUTO: 0.68 10*3/MM3 (ref 0.1–0.9)
MONOCYTES NFR BLD AUTO: 11.4 % (ref 5–12)
NEUTROPHILS NFR BLD AUTO: 3.88 10*3/MM3 (ref 1.7–7)
NEUTROPHILS NFR BLD AUTO: 65.2 % (ref 42.7–76)
NITRITE UR QL STRIP: NEGATIVE
NRBC BLD AUTO-RTO: 0 /100 WBC (ref 0–0.2)
PH UR STRIP.AUTO: 5.5 [PH] (ref 4.5–8)
PLATELET # BLD AUTO: 271 10*3/MM3 (ref 140–450)
PMV BLD AUTO: 10.4 FL (ref 6–12)
POTASSIUM SERPL-SCNC: 4 MMOL/L (ref 3.5–5.2)
PROT SERPL-MCNC: 6.8 G/DL (ref 6–8.5)
PROT UR QL STRIP: NEGATIVE
RBC # BLD AUTO: 5.14 10*6/MM3 (ref 3.77–5.28)
SODIUM SERPL-SCNC: 134 MMOL/L (ref 136–145)
SP GR UR STRIP: 1.01 (ref 1–1.03)
UROBILINOGEN UR QL STRIP: NORMAL
WBC NRBC COR # BLD AUTO: 5.96 10*3/MM3 (ref 3.4–10.8)

## 2024-03-30 PROCEDURE — 81003 URINALYSIS AUTO W/O SCOPE: CPT | Performed by: STUDENT IN AN ORGANIZED HEALTH CARE EDUCATION/TRAINING PROGRAM

## 2024-03-30 PROCEDURE — 96361 HYDRATE IV INFUSION ADD-ON: CPT

## 2024-03-30 PROCEDURE — 80053 COMPREHEN METABOLIC PANEL: CPT | Performed by: STUDENT IN AN ORGANIZED HEALTH CARE EDUCATION/TRAINING PROGRAM

## 2024-03-30 PROCEDURE — 99283 EMERGENCY DEPT VISIT LOW MDM: CPT

## 2024-03-30 PROCEDURE — 25010000002 ONDANSETRON PER 1 MG: Performed by: STUDENT IN AN ORGANIZED HEALTH CARE EDUCATION/TRAINING PROGRAM

## 2024-03-30 PROCEDURE — 83690 ASSAY OF LIPASE: CPT | Performed by: STUDENT IN AN ORGANIZED HEALTH CARE EDUCATION/TRAINING PROGRAM

## 2024-03-30 PROCEDURE — 85025 COMPLETE CBC W/AUTO DIFF WBC: CPT | Performed by: STUDENT IN AN ORGANIZED HEALTH CARE EDUCATION/TRAINING PROGRAM

## 2024-03-30 PROCEDURE — 25810000003 SODIUM CHLORIDE 0.9 % SOLUTION: Performed by: STUDENT IN AN ORGANIZED HEALTH CARE EDUCATION/TRAINING PROGRAM

## 2024-03-30 PROCEDURE — 36415 COLL VENOUS BLD VENIPUNCTURE: CPT

## 2024-03-30 PROCEDURE — 84703 CHORIONIC GONADOTROPIN ASSAY: CPT | Performed by: STUDENT IN AN ORGANIZED HEALTH CARE EDUCATION/TRAINING PROGRAM

## 2024-03-30 PROCEDURE — 96374 THER/PROPH/DIAG INJ IV PUSH: CPT

## 2024-03-30 RX ORDER — ONDANSETRON 4 MG/1
4 TABLET, ORALLY DISINTEGRATING ORAL EVERY 8 HOURS PRN
Qty: 15 TABLET | Refills: 0 | Status: SHIPPED | OUTPATIENT
Start: 2024-03-30 | End: 2024-04-04

## 2024-03-30 RX ORDER — ONDANSETRON 2 MG/ML
4 INJECTION INTRAMUSCULAR; INTRAVENOUS ONCE
Status: COMPLETED | OUTPATIENT
Start: 2024-03-30 | End: 2024-03-30

## 2024-03-30 RX ADMIN — SODIUM CHLORIDE 1000 ML: 9 INJECTION, SOLUTION INTRAVENOUS at 11:17

## 2024-03-30 RX ADMIN — SODIUM CHLORIDE 1000 ML: 9 INJECTION, SOLUTION INTRAVENOUS at 11:15

## 2024-03-30 RX ADMIN — ONDANSETRON 4 MG: 2 INJECTION INTRAMUSCULAR; INTRAVENOUS at 11:18

## 2024-03-30 NOTE — ED PROVIDER NOTES
Subjective   History of Present Illness  Pt is a 38 y.o. female with PMH as listed who presents for   Chief Complaint   Patient presents with    Diarrhea    Nausea       Patient is a 38-year-old female presents for nausea vomiting and diarrhea for the past week.  She has been treated with multiple antibiotics for UTI over the past week with amoxicillin as well as Macrobid.  Nausea vomiting of increased over the past couple of days since being on these antibiotics recently.  No abdominal pain, fever, dysuria or increased urinary frequency.  No other new complaints at this time.    Review of Systems    Past Medical History:   Diagnosis Date    ADHD (attention deficit hyperactivity disorder)     Anemia  and     postpartum    Depression     Gestational hypertension  and     History of medical problems     Hypertension     Migraine 2008 occasional since    Obesity     PMS (premenstrual syndrome) normal    Pre-eclampsia in third trimester 10/28/2020    Urinary tract infection 2005 constantly    Varicella as a child very severe       No Known Allergies    Past Surgical History:   Procedure Laterality Date    WISDOM TOOTH EXTRACTION         Family History   Problem Relation Age of Onset    Breast cancer Mother         BRCA -    Melanoma Father     Stroke Father         has had 2 strokes    Diabetes Father     Hypertension Father     Stroke Maternal Grandfather         I believe he  of a stroke    Diabetes Maternal Grandfather     Diabetes Maternal Grandmother     Prostate cancer Paternal Grandfather     Diabetes Paternal Grandfather     Hypertension Paternal Grandfather     Ovarian cancer Paternal Grandmother     Breast cancer Paternal Aunt         BRCA +    Breast cancer Paternal Aunt         BRCA +    Uterine cancer Paternal Aunt     Hypertension Paternal Uncle        Social History     Socioeconomic History    Marital status:    Tobacco Use    Smoking status: Never     Passive exposure:  Never    Smokeless tobacco: Never   Vaping Use    Vaping status: Never Used   Substance and Sexual Activity    Alcohol use: Not Currently    Drug use: Never    Sexual activity: Yes     Partners: Male     Birth control/protection: I.U.D.           Objective   Physical Exam  Constitutional:       Appearance: Normal appearance.   HENT:      Head: Normocephalic and atraumatic.      Mouth/Throat:      Mouth: Mucous membranes are moist.      Pharynx: Oropharynx is clear.   Eyes:      Conjunctiva/sclera: Conjunctivae normal.   Cardiovascular:      Rate and Rhythm: Normal rate.   Pulmonary:      Effort: Pulmonary effort is normal.   Abdominal:      General: Abdomen is flat.      Palpations: Abdomen is soft.      Tenderness: There is no abdominal tenderness.   Musculoskeletal:      Cervical back: Neck supple.   Skin:     General: Skin is warm and dry.   Neurological:      Mental Status: She is alert.   Psychiatric:         Mood and Affect: Mood normal.         Procedures           ED Course  ED Course as of 03/30/24 1230   Sat Mar 30, 2024   1053 Patient is a 38-year-old female presents for nausea vomiting and diarrhea for the past week.  Exam is unremarkable.  Will obtain CBC, CMP, lipase as well as UA and hCG.  Patient treated with Zofran and fluid bolus. [JF]   1230 All lab work unremarkable for any acute findings.  White count normal, lipase and hCG both normal.  Patient feels better after fluid bolus and Zofran.  Discussed the plan for discharge with PCP follow-up and prescription for Zofran provided.  She understands and agrees with plan of care.  All questions answered. [JF]      ED Course User Index  [JF] Carlo Duffy MD                                             Medical Decision Making  My differential diagnosis for vomiting includes but is not limited to viral illness including COVID-19, gastroenteritis, esophageal food impaction, pregnancy related vomiting, cyclic vomiting, food poisoning, alcohol poisoning,  alcohol withdrawal, opioid withdrawal, benzo withdrawal, medication side effects, overdose, chemical ingestion, chemical exposures, gallbladder disease, appendicitis, bowel obstruction, DKA, AKA and panic attacks.      Problems Addressed:  Diarrhea, unspecified type: complicated acute illness or injury  Nausea and vomiting, unspecified vomiting type: complicated acute illness or injury    Amount and/or Complexity of Data Reviewed  Labs: ordered.     Details: Lab work unremarkable for any acute findings.    Risk  Prescription drug management.        Final diagnoses:   Nausea and vomiting, unspecified vomiting type   Diarrhea, unspecified type       ED Disposition  ED Disposition       ED Disposition   Discharge    Condition   Stable    Comment   --               Toño Vaughan MD  [address]  [facility] [id number] [phone]    Schedule an appointment as soon as possible for a visit in 2 days  For re-evaluation         Medication List        Changed      * ondansetron ODT 4 MG disintegrating tablet  Commonly known as: ZOFRAN-ODT  Place 1 tablet on the tongue 4 (Four) Times a Day As Needed for Nausea or Vomiting.  What changed: Another medication with the same name was added. Make sure you understand how and when to take each.     * ondansetron ODT 4 MG disintegrating tablet  Commonly known as: ZOFRAN-ODT  Place 1 tablet on the tongue Every 8 (Eight) Hours As Needed for Nausea for up to 5 days.  What changed: You were already taking a medication with the same name, and this prescription was added. Make sure you understand how and when to take each.           * This list has 2 medication(s) that are the same as other medications prescribed for you. Read the directions carefully, and ask your doctor or other care provider to review them with you.                   Where to Get Your Medications        These medications were sent to [facility] [address] [facility] [address] [place], [place] [address] - [phone]  -  [phone] FX  [address], [place] [id number]      Phone: [phone]   ondansetron ODT 4 MG disintegrating tablet            Carlo Duffy MD  03/30/24 9842

## 2024-04-17 ENCOUNTER — APPOINTMENT (OUTPATIENT)
Dept: GENERAL RADIOLOGY | Facility: HOSPITAL | Age: 39
End: 2024-04-17
Payer: COMMERCIAL

## 2024-04-17 ENCOUNTER — HOSPITAL ENCOUNTER (EMERGENCY)
Facility: HOSPITAL | Age: 39
Discharge: HOME | End: 2024-04-17
Attending: STUDENT IN AN ORGANIZED HEALTH CARE EDUCATION/TRAINING PROGRAM
Payer: COMMERCIAL

## 2024-04-17 VITALS
HEIGHT: 68 IN | WEIGHT: 293 LBS | BODY MASS INDEX: 44.41 KG/M2 | OXYGEN SATURATION: 99 % | HEART RATE: 82 BPM | TEMPERATURE: 98.6 F | SYSTOLIC BLOOD PRESSURE: 117 MMHG | RESPIRATION RATE: 22 BRPM | DIASTOLIC BLOOD PRESSURE: 67 MMHG

## 2024-04-17 DIAGNOSIS — F41.9 ANXIETY: ICD-10-CM

## 2024-04-17 DIAGNOSIS — N17.9 AKI (ACUTE KIDNEY INJURY): ICD-10-CM

## 2024-04-17 DIAGNOSIS — R07.9 CHEST PAIN, UNSPECIFIED TYPE: Primary | ICD-10-CM

## 2024-04-17 LAB
ALBUMIN SERPL-MCNC: 4.2 G/DL (ref 3.5–5.2)
ALBUMIN/GLOB SERPL: 1.2 G/DL
ALP SERPL-CCNC: 134 U/L (ref 39–117)
ALT SERPL W P-5'-P-CCNC: 19 U/L (ref 1–33)
ANION GAP SERPL CALCULATED.3IONS-SCNC: 15.3 MMOL/L (ref 5–15)
AST SERPL-CCNC: 16 U/L (ref 1–32)
BASOPHILS # BLD AUTO: 0.03 10*3/MM3 (ref 0–0.2)
BASOPHILS NFR BLD AUTO: 0.3 % (ref 0–1.5)
BILIRUB SERPL-MCNC: 0.3 MG/DL (ref 0–1.2)
BUN SERPL-MCNC: 15 MG/DL (ref 6–20)
BUN/CREAT SERPL: 11 (ref 7–25)
CALCIUM SPEC-SCNC: 9.4 MG/DL (ref 8.6–10.5)
CHLORIDE SERPL-SCNC: 99 MMOL/L (ref 98–107)
CO2 SERPL-SCNC: 22.7 MMOL/L (ref 22–29)
CREAT SERPL-MCNC: 1.36 MG/DL (ref 0.57–1)
D DIMER PPP FEU-MCNC: 0.32 MCGFEU/ML (ref 0–0.5)
DEPRECATED RDW RBC AUTO: 42.5 FL (ref 37–54)
EGFRCR SERPLBLD CKD-EPI 2021: 51.9 ML/MIN/1.73
EOSINOPHIL # BLD AUTO: 0.12 10*3/MM3 (ref 0–0.4)
EOSINOPHIL NFR BLD AUTO: 1.1 % (ref 0.3–6.2)
ERYTHROCYTE [DISTWIDTH] IN BLOOD BY AUTOMATED COUNT: 14.1 % (ref 12.3–15.4)
GEN 5 2HR TROPONIN T REFLEX: <6 NG/L
GLOBULIN UR ELPH-MCNC: 3.4 GM/DL
GLUCOSE SERPL-MCNC: 97 MG/DL (ref 65–99)
HCT VFR BLD AUTO: 43 % (ref 34–46.6)
HGB BLD-MCNC: 14.1 G/DL (ref 12–15.9)
IMM GRANULOCYTES # BLD AUTO: 0.06 10*3/MM3 (ref 0–0.05)
IMM GRANULOCYTES NFR BLD AUTO: 0.5 % (ref 0–0.5)
LYMPHOCYTES # BLD AUTO: 2.23 10*3/MM3 (ref 0.7–3.1)
LYMPHOCYTES NFR BLD AUTO: 19.8 % (ref 19.6–45.3)
MCH RBC QN AUTO: 27.1 PG (ref 26.6–33)
MCHC RBC AUTO-ENTMCNC: 32.8 G/DL (ref 31.5–35.7)
MCV RBC AUTO: 82.7 FL (ref 79–97)
MONOCYTES # BLD AUTO: 0.77 10*3/MM3 (ref 0.1–0.9)
MONOCYTES NFR BLD AUTO: 6.8 % (ref 5–12)
NEUTROPHILS NFR BLD AUTO: 71.5 % (ref 42.7–76)
NEUTROPHILS NFR BLD AUTO: 8.05 10*3/MM3 (ref 1.7–7)
NRBC BLD AUTO-RTO: 0 /100 WBC (ref 0–0.2)
PLATELET # BLD AUTO: 351 10*3/MM3 (ref 140–450)
PMV BLD AUTO: 10.5 FL (ref 6–12)
POTASSIUM SERPL-SCNC: 3.8 MMOL/L (ref 3.5–5.2)
PROT SERPL-MCNC: 7.6 G/DL (ref 6–8.5)
QT INTERVAL: 353 MS
QTC INTERVAL: 445 MS
RBC # BLD AUTO: 5.2 10*6/MM3 (ref 3.77–5.28)
SODIUM SERPL-SCNC: 137 MMOL/L (ref 136–145)
TROPONIN T DELTA: NORMAL
TROPONIN T SERPL HS-MCNC: <6 NG/L
WBC NRBC COR # BLD AUTO: 11.26 10*3/MM3 (ref 3.4–10.8)

## 2024-04-17 PROCEDURE — 96374 THER/PROPH/DIAG INJ IV PUSH: CPT

## 2024-04-17 PROCEDURE — 25010000002 KETOROLAC TROMETHAMINE PER 15 MG: Performed by: STUDENT IN AN ORGANIZED HEALTH CARE EDUCATION/TRAINING PROGRAM

## 2024-04-17 PROCEDURE — 36415 COLL VENOUS BLD VENIPUNCTURE: CPT

## 2024-04-17 PROCEDURE — 71045 X-RAY EXAM CHEST 1 VIEW: CPT

## 2024-04-17 PROCEDURE — 93010 ELECTROCARDIOGRAM REPORT: CPT | Performed by: INTERNAL MEDICINE

## 2024-04-17 PROCEDURE — 93005 ELECTROCARDIOGRAM TRACING: CPT | Performed by: STUDENT IN AN ORGANIZED HEALTH CARE EDUCATION/TRAINING PROGRAM

## 2024-04-17 PROCEDURE — 84484 ASSAY OF TROPONIN QUANT: CPT | Performed by: STUDENT IN AN ORGANIZED HEALTH CARE EDUCATION/TRAINING PROGRAM

## 2024-04-17 PROCEDURE — 85379 FIBRIN DEGRADATION QUANT: CPT | Performed by: STUDENT IN AN ORGANIZED HEALTH CARE EDUCATION/TRAINING PROGRAM

## 2024-04-17 PROCEDURE — 96375 TX/PRO/DX INJ NEW DRUG ADDON: CPT

## 2024-04-17 PROCEDURE — 80053 COMPREHEN METABOLIC PANEL: CPT | Performed by: STUDENT IN AN ORGANIZED HEALTH CARE EDUCATION/TRAINING PROGRAM

## 2024-04-17 PROCEDURE — 85025 COMPLETE CBC W/AUTO DIFF WBC: CPT | Performed by: STUDENT IN AN ORGANIZED HEALTH CARE EDUCATION/TRAINING PROGRAM

## 2024-04-17 PROCEDURE — 99284 EMERGENCY DEPT VISIT MOD MDM: CPT

## 2024-04-17 PROCEDURE — 25010000002 MIDAZOLAM PER 1MG

## 2024-04-17 RX ORDER — MIDAZOLAM HYDROCHLORIDE 1 MG/ML
1 INJECTION INTRAMUSCULAR; INTRAVENOUS ONCE
Status: COMPLETED | OUTPATIENT
Start: 2024-04-17 | End: 2024-04-17

## 2024-04-17 RX ORDER — HYDROXYZINE HYDROCHLORIDE 10 MG/1
10 TABLET, FILM COATED ORAL 3 TIMES DAILY PRN
Qty: 15 TABLET | Refills: 0 | Status: SHIPPED | OUTPATIENT
Start: 2024-04-17 | End: 2024-04-22

## 2024-04-17 RX ORDER — KETOROLAC TROMETHAMINE 30 MG/ML
15 INJECTION, SOLUTION INTRAMUSCULAR; INTRAVENOUS ONCE
Status: COMPLETED | OUTPATIENT
Start: 2024-04-17 | End: 2024-04-17

## 2024-04-17 RX ORDER — MIDAZOLAM HYDROCHLORIDE 2 MG/2ML
INJECTION, SOLUTION INTRAMUSCULAR; INTRAVENOUS
Status: COMPLETED
Start: 2024-04-17 | End: 2024-04-17

## 2024-04-17 RX ADMIN — KETOROLAC TROMETHAMINE 15 MG: 30 INJECTION, SOLUTION INTRAMUSCULAR; INTRAVENOUS at 18:00

## 2024-04-17 RX ADMIN — MIDAZOLAM HYDROCHLORIDE 1 MG: 1 INJECTION INTRAMUSCULAR; INTRAVENOUS at 18:01

## 2024-04-17 RX ADMIN — MIDAZOLAM HYDROCHLORIDE 1 MG: 1 INJECTION, SOLUTION INTRAMUSCULAR; INTRAVENOUS at 18:01

## 2024-04-17 NOTE — ED PROVIDER NOTES
Subjective   History of Present Illness  Pt is a 38 y.o. female with PMH as listed who presents for   Chief Complaint   Patient presents with    Chest Pain     Left sided chest pain, soa        Patient is a 38-year-old female presents for left-sided chest pain and shortness of breath began about an hour prior to arrival.  States that she was working at her desk when her symptoms began suddenly.  She states that she has a moderate to high level of stress but no new stressors today.  She is extremely anxious at this time and states that she is scared that there is something wrong.  No nausea, vomiting, diarrhea.  No fever.  No URI symptoms.  Has no other new complaints at this time.  History of hypertension but otherwise normal past medical history, no history of smoking, no significant past family history.  No other new complaints at this time.    Review of Systems    Past Medical History:   Diagnosis Date    ADHD (attention deficit hyperactivity disorder)     Anemia  and     postpartum    Depression     Gestational hypertension  and     History of medical problems     Hypertension     Migraine 2008 occasional since    Obesity     PMS (premenstrual syndrome) normal    Pre-eclampsia in third trimester 10/28/2020    Urinary tract infection 2005 constantly    Varicella as a child very severe       No Known Allergies    Past Surgical History:   Procedure Laterality Date    WISDOM TOOTH EXTRACTION  2003       Family History   Problem Relation Age of Onset    Breast cancer Mother         BRCA -    Melanoma Father     Stroke Father         has had 2 strokes    Diabetes Father     Hypertension Father     Stroke Maternal Grandfather         I believe he  of a stroke    Diabetes Maternal Grandfather     Diabetes Maternal Grandmother     Prostate cancer Paternal Grandfather     Diabetes Paternal Grandfather     Hypertension Paternal Grandfather     Ovarian cancer Paternal Grandmother     Breast cancer  Paternal Aunt         BRCA +    Breast cancer Paternal Aunt         BRCA +    Uterine cancer Paternal Aunt     Hypertension Paternal Uncle        Social History     Socioeconomic History    Marital status:    Tobacco Use    Smoking status: Never     Passive exposure: Never    Smokeless tobacco: Never   Vaping Use    Vaping status: Never Used   Substance and Sexual Activity    Alcohol use: Not Currently    Drug use: Never    Sexual activity: Yes     Partners: Male     Birth control/protection: I.U.D.           Objective   Physical Exam  Constitutional:       Appearance: Normal appearance.   HENT:      Head: Normocephalic and atraumatic.      Mouth/Throat:      Mouth: Mucous membranes are moist.      Pharynx: Oropharynx is clear.   Eyes:      Conjunctiva/sclera: Conjunctivae normal.   Cardiovascular:      Rate and Rhythm: Normal rate and regular rhythm.   Pulmonary:      Effort: Pulmonary effort is normal.      Breath sounds: Normal breath sounds.   Chest:      Chest wall: Tenderness (left chest wall TTP) present.   Abdominal:      General: Abdomen is flat.      Palpations: Abdomen is soft.   Musculoskeletal:      Cervical back: Neck supple.   Skin:     General: Skin is warm and dry.   Neurological:      Mental Status: She is alert.   Psychiatric:         Mood and Affect: Mood normal.         Procedures           ED Course  ED Course as of 04/17/24 2020 Wed Apr 17, 2024   1728 Patient is a 38-year-old female presents for left-sided chest pain and shortness of breath that began about an hour prior to arrival.  Exam is significant for left-sided chest wall tenderness to palpation.  Will obtain CBC, CMP, troponin, D-dimer as well as EKG and chest x-ray.  Patient treated with Toradol and 1 mg Versed. [JF]   2019 Lab work significant for creatinine of 1.36, otherwise unremarkable.  Possibly due to patient's Toradol injection.  Discussed with patient results of workup plan for discharge with PCP follow-up, given  prescription for hydroxyzine for the next several days for any recurrent episodes of severe anxiety.  She understands and agrees with plan of care.  All questions answered. [JF]      ED Course User Index  [JF] Carlo Duffy MD                HEART Score: 1                              Medical Decision Making  My differential diagnosis for chest pain includes but is not limited to:  Muscle strain, costochondritis, myositis, pleurisy, rib fracture, intercostal neuritis, herpes zoster, tumor, myocardial infarction, coronary syndrome, unstable angina, angina, aortic dissection, mitral valve prolapse, pericarditis, palpitations, pulmonary embolus, pneumonia, pneumothorax, lung cancer, GERD, esophagitis, esophageal spasm      Problems Addressed:  DANIEL (acute kidney injury): complicated acute illness or injury  Anxiety: complicated acute illness or injury  Chest pain, unspecified type: complicated acute illness or injury    Amount and/or Complexity of Data Reviewed  Labs: ordered.     Details: Creatinine 1.36, rest of lab work unremarkable for any acute findings.  Radiology: ordered.     Details: Chest x-ray shows no acute pulmonary process based on my interpretation.  ECG/medicine tests: ordered.     Details: EKG  4/17/2024 at 1724  Rhythm sinus, rate 95  Normal axis, normal intervals, isolated Q-wave and T wave inversion of lead III, no other ST changes  EKG interpreted by me contemporaneously by me with care    Risk  Prescription drug management.        Final diagnoses:   Chest pain, unspecified type   DANIEL (acute kidney injury)   Anxiety       ED Disposition  ED Disposition       ED Disposition   Discharge    Condition   Stable    Comment   --               Toño Vaughan MD  [address]  [facility] [address]  [phone]    Schedule an appointment as soon as possible for a visit in 2 days  For re-evaluation         Medication List        New Prescriptions      hydrOXYzine 10 MG tablet  Commonly known as: ATARAX  Take  1 tablet by mouth 3 (Three) Times a Day As Needed for Itching for up to 5 days.               Where to Get Your Medications        These medications were sent to [id number] [address] - [phone]  - [phone] [facility] [facility] [address]      Phone: [phone]   hydrOXYzine 10 MG tablet            Carlo Duffy MD  04/17/24 2020

## 2024-04-18 ENCOUNTER — TELEPHONE (OUTPATIENT)
Dept: INTERNAL MEDICINE | Facility: CLINIC | Age: 39
End: 2024-04-18
Payer: COMMERCIAL

## 2024-04-18 NOTE — TELEPHONE ENCOUNTER
I got her in tomorrow for lab work and next Thursday for an appt.  I already spoke with her, too.    Thank you

## 2024-04-18 NOTE — TELEPHONE ENCOUNTER
Okay for hub to read*  Spoke with Alice, she's needing an ER follow up with Dr. Vaughan. Last ER visit was 04/17/2024. I did not have any hospital follow up spots available within the week of discharge. Let us know how we can help!

## 2024-04-19 ENCOUNTER — LAB (OUTPATIENT)
Dept: INTERNAL MEDICINE | Facility: CLINIC | Age: 39
End: 2024-04-19
Payer: COMMERCIAL

## 2024-04-19 DIAGNOSIS — N17.9 AKI (ACUTE KIDNEY INJURY): Primary | ICD-10-CM

## 2024-04-20 LAB
BUN SERPL-MCNC: 11 MG/DL (ref 6–20)
BUN/CREAT SERPL: 16.7 (ref 7–25)
CALCIUM SERPL-MCNC: 9.1 MG/DL (ref 8.6–10.5)
CHLORIDE SERPL-SCNC: 105 MMOL/L (ref 98–107)
CO2 SERPL-SCNC: 23.3 MMOL/L (ref 22–29)
CREAT SERPL-MCNC: 0.66 MG/DL (ref 0.57–1)
EGFRCR SERPLBLD CKD-EPI 2021: 116.8 ML/MIN/1.73
GLUCOSE SERPL-MCNC: 82 MG/DL (ref 65–99)
POTASSIUM SERPL-SCNC: 4.4 MMOL/L (ref 3.5–5.2)
SODIUM SERPL-SCNC: 140 MMOL/L (ref 136–145)

## 2024-04-25 ENCOUNTER — OFFICE VISIT (OUTPATIENT)
Dept: INTERNAL MEDICINE | Facility: CLINIC | Age: 39
End: 2024-04-25
Payer: COMMERCIAL

## 2024-04-25 VITALS
WEIGHT: 293 LBS | RESPIRATION RATE: 20 BRPM | DIASTOLIC BLOOD PRESSURE: 84 MMHG | SYSTOLIC BLOOD PRESSURE: 122 MMHG | HEART RATE: 70 BPM | OXYGEN SATURATION: 98 % | BODY MASS INDEX: 44.41 KG/M2 | HEIGHT: 68 IN

## 2024-04-25 DIAGNOSIS — F41.1 GENERALIZED ANXIETY DISORDER: ICD-10-CM

## 2024-04-25 DIAGNOSIS — N97.9 INFERTILITY, FEMALE: ICD-10-CM

## 2024-04-25 DIAGNOSIS — R63.5 WEIGHT GAIN: Primary | ICD-10-CM

## 2024-04-25 RX ORDER — ESCITALOPRAM OXALATE 5 MG/1
5 TABLET ORAL DAILY
Qty: 30 TABLET | Refills: 2 | Status: SHIPPED | OUTPATIENT
Start: 2024-04-25

## 2024-04-25 RX ORDER — HYDROXYZINE HYDROCHLORIDE 10 MG/1
10 TABLET, FILM COATED ORAL
COMMUNITY

## 2024-04-25 RX ORDER — BUSPIRONE HYDROCHLORIDE 5 MG/1
5 TABLET ORAL 3 TIMES DAILY
Qty: 90 TABLET | Refills: 2 | Status: SHIPPED | OUTPATIENT
Start: 2024-04-25

## 2024-04-25 NOTE — PROGRESS NOTES
"Chief Complaint  Hospital Follow Up Visit (Patient went to [facility] on 4/17/24 for chest pain and panic attack/Not feeling better)    Subjective        Alice Adams presents to [facility]  History of Present Illness  Here for hospital f/u   Had uti, then nausea/vomiting, then panic attack/chest pain  She is still very anxious and under a lot of stress  She feels like something is wrong, lots of weight gain, difficulty losing weight, increased size of neck  She has been trying to get pregnant for last year with no luck wants to get hormones checked    Objective   Vital Signs:  /84   Pulse 70   Resp 20   Ht 172.7 cm (68\")   Wt (!) 140 kg (309 lb 9.6 oz)   SpO2 98%   BMI 47.07 kg/m²   Estimated body mass index is 47.07 kg/m² as calculated from the following:    Height as of this encounter: 172.7 cm (68\").    Weight as of this encounter: 140 kg (309 lb 9.6 oz).       Physical Exam  Vitals and nursing note reviewed.   Constitutional:       General: She is not in acute distress.     Appearance: Normal appearance.   HENT:      Head: Normocephalic and atraumatic.      Right Ear: External ear normal.      Left Ear: External ear normal.      Nose: Nose normal. No congestion.      Mouth/Throat:      Mouth: Mucous membranes are moist.      Pharynx: No oropharyngeal exudate or posterior oropharyngeal erythema.   Eyes:      Extraocular Movements: Extraocular movements intact.      Conjunctiva/sclera: Conjunctivae normal.      Pupils: Pupils are equal, round, and reactive to light.   Cardiovascular:      Rate and Rhythm: Normal rate and regular rhythm.      Pulses: Normal pulses.      Heart sounds: Normal heart sounds. No murmur heard.  Pulmonary:      Effort: Pulmonary effort is normal. No respiratory distress.      Breath sounds: Normal breath sounds. No wheezing or rales.   Musculoskeletal:      Cervical back: Normal range of motion.   Skin:     General: Skin is " warm.      Capillary Refill: Capillary refill takes less than 2 seconds.   Neurological:      General: No focal deficit present.      Mental Status: She is alert and oriented to person, place, and time. Mental status is at baseline.      Cranial Nerves: No cranial nerve deficit.   Psychiatric:         Mood and Affect: Mood normal.         Behavior: Behavior normal.         Thought Content: Thought content normal.      Result Review :    The following data was reviewed by: Toño Vaughan MD on 04/25/2024:  Common labs          3/30/2024    11:11 4/17/2024    17:32 4/19/2024    14:40   Common Labs   Glucose 89  97  82    BUN 12  15  11    Creatinine 0.76  1.36  0.66    Sodium 134  137  140    Potassium 4.0  3.8  4.4    Chloride 102  99  105    Calcium 8.2  9.4  9.1    Albumin 4.2  4.2     Total Bilirubin 0.2  0.3     Alkaline Phosphatase 116  134     AST (SGOT) 35  16     ALT (SGPT) 53  19     WBC 5.96  11.26     Hemoglobin 14.2  14.1     Hematocrit 42.8  43.0     Platelets 271  351       Data reviewed : prior office notes             Assessment and Plan     Diagnoses and all orders for this visit:    1. Weight gain (Primary)  -     CBC w AUTO Differential  -     Comprehensive metabolic panel  -     Hemoglobin A1c  -     TSH  -     T4, free  -     Lipid panel  -     Cortisol  -     T3  Check for reversible causes as above, discussed potential etiologies    2. Infertility, female  -     Prolactin  -     FSH & LH  -     Progesterone  -     Estradiol  -     Testosterone, Free, Total  Will check labs as above, she already has appt with fertility specialist in July    3. Generalized anxiety disorder  -     escitalopram (Lexapro) 5 MG tablet; Take 1 tablet by mouth Daily.  Dispense: 30 tablet; Refill: 2  -     busPIRone (BUSPAR) 5 MG tablet; Take 1 tablet by mouth 3 (Three) Times a Day.  Dispense: 90 tablet; Refill: 2  Uncontrolled, start lexapro with buspar for augmentation         I spent 20 minutes caring for Alice on this  date of service. This time includes time spent by me in the following activities:preparing for the visit, reviewing tests, obtaining and/or reviewing a separately obtained history, performing a medically appropriate examination and/or evaluation , counseling and educating the patient/family/caregiver, ordering medications, tests, or procedures, and documenting information in the medical record  Follow Up     F/u 6 weeks   Patient was given instructions and counseling regarding her condition or for health maintenance advice. Please see specific information pulled into the AVS if appropriate.     Toño Vaughan MD  [facility]  [facility] [address]  Phone: [phone]

## 2024-04-27 LAB
ALBUMIN SERPL-MCNC: 3.9 G/DL (ref 3.5–5.2)
ALBUMIN/GLOB SERPL: 1.7 G/DL
ALP SERPL-CCNC: 105 U/L (ref 39–117)
ALT SERPL-CCNC: 18 U/L (ref 1–33)
AST SERPL-CCNC: 16 U/L (ref 1–32)
BASOPHILS # BLD AUTO: 0.02 10*3/MM3 (ref 0–0.2)
BASOPHILS NFR BLD AUTO: 0.3 % (ref 0–1.5)
BILIRUB SERPL-MCNC: 0.3 MG/DL (ref 0–1.2)
BUN SERPL-MCNC: 11 MG/DL (ref 6–20)
BUN/CREAT SERPL: 12.9 (ref 7–25)
CALCIUM SERPL-MCNC: 9.1 MG/DL (ref 8.6–10.5)
CHLORIDE SERPL-SCNC: 104 MMOL/L (ref 98–107)
CHOLEST SERPL-MCNC: 164 MG/DL (ref 0–200)
CO2 SERPL-SCNC: 25.1 MMOL/L (ref 22–29)
CORTIS SERPL-MCNC: 7.7 UG/DL (ref 6.2–19.4)
CREAT SERPL-MCNC: 0.85 MG/DL (ref 0.57–1)
EGFRCR SERPLBLD CKD-EPI 2021: 91.2 ML/MIN/1.73
EOSINOPHIL # BLD AUTO: 0.16 10*3/MM3 (ref 0–0.4)
EOSINOPHIL NFR BLD AUTO: 2.1 % (ref 0.3–6.2)
ERYTHROCYTE [DISTWIDTH] IN BLOOD BY AUTOMATED COUNT: 14.3 % (ref 12.3–15.4)
ESTRADIOL SERPL-MCNC: 103 PG/ML
FSH SERPL-ACNC: 7.8 MIU/ML
GLOBULIN SER CALC-MCNC: 2.3 GM/DL
GLUCOSE SERPL-MCNC: 90 MG/DL (ref 65–99)
HBA1C MFR BLD: 5.5 % (ref 4.8–5.6)
HCT VFR BLD AUTO: 40.1 % (ref 34–46.6)
HDLC SERPL-MCNC: 47 MG/DL (ref 40–60)
HGB BLD-MCNC: 12.9 G/DL (ref 12–15.9)
IMM GRANULOCYTES # BLD AUTO: 0.04 10*3/MM3 (ref 0–0.05)
IMM GRANULOCYTES NFR BLD AUTO: 0.5 % (ref 0–0.5)
LDLC SERPL CALC-MCNC: 93 MG/DL (ref 0–100)
LH SERPL-ACNC: 23.6 MIU/ML
LYMPHOCYTES # BLD AUTO: 1.61 10*3/MM3 (ref 0.7–3.1)
LYMPHOCYTES NFR BLD AUTO: 20.9 % (ref 19.6–45.3)
MCH RBC QN AUTO: 27.3 PG (ref 26.6–33)
MCHC RBC AUTO-ENTMCNC: 32.2 G/DL (ref 31.5–35.7)
MCV RBC AUTO: 84.8 FL (ref 79–97)
MONOCYTES # BLD AUTO: 0.52 10*3/MM3 (ref 0.1–0.9)
MONOCYTES NFR BLD AUTO: 6.7 % (ref 5–12)
NEUTROPHILS # BLD AUTO: 5.36 10*3/MM3 (ref 1.7–7)
NEUTROPHILS NFR BLD AUTO: 69.5 % (ref 42.7–76)
NRBC BLD AUTO-RTO: 0 /100 WBC (ref 0–0.2)
PLATELET # BLD AUTO: 277 10*3/MM3 (ref 140–450)
POTASSIUM SERPL-SCNC: 4.5 MMOL/L (ref 3.5–5.2)
PROGEST SERPL-MCNC: 0.7 NG/ML
PROLACTIN SERPL-MCNC: 15.4 NG/ML (ref 4.8–33.4)
PROT SERPL-MCNC: 6.2 G/DL (ref 6–8.5)
RBC # BLD AUTO: 4.73 10*6/MM3 (ref 3.77–5.28)
SODIUM SERPL-SCNC: 139 MMOL/L (ref 136–145)
T3 SERPL-MCNC: 148 NG/DL (ref 80–200)
T4 FREE SERPL-MCNC: 1.13 NG/DL (ref 0.93–1.7)
TESTOST FREE SERPL-MCNC: 1.8 PG/ML (ref 0–4.2)
TESTOST SERPL-MCNC: 32 NG/DL (ref 8–60)
TRIGL SERPL-MCNC: 139 MG/DL (ref 0–150)
TSH SERPL DL<=0.005 MIU/L-ACNC: 0.85 UIU/ML (ref 0.27–4.2)
VLDLC SERPL CALC-MCNC: 24 MG/DL (ref 5–40)
WBC # BLD AUTO: 7.71 10*3/MM3 (ref 3.4–10.8)

## 2024-05-01 LAB
ALBUMIN SERPL-MCNC: 3.9 G/DL (ref 3.5–5.2)
ALBUMIN/GLOB SERPL: 1.7 G/DL
ALP SERPL-CCNC: 105 U/L (ref 39–117)
ALT SERPL-CCNC: 18 U/L (ref 1–33)
AST SERPL-CCNC: 16 U/L (ref 1–32)
BASOPHILS # BLD AUTO: 0.02 10*3/MM3 (ref 0–0.2)
BASOPHILS NFR BLD AUTO: 0.3 % (ref 0–1.5)
BILIRUB SERPL-MCNC: 0.3 MG/DL (ref 0–1.2)
BUN SERPL-MCNC: 11 MG/DL (ref 6–20)
BUN/CREAT SERPL: 14.9 (ref 7–25)
CALCIUM SERPL-MCNC: 9.1 MG/DL (ref 8.6–10.5)
CHLORIDE SERPL-SCNC: 104 MMOL/L (ref 98–107)
CHOLEST SERPL-MCNC: 164 MG/DL (ref 0–200)
CO2 SERPL-SCNC: 25.1 MMOL/L (ref 22–29)
CORTIS SERPL-MCNC: 7.7 UG/DL (ref 6.2–19.4)
CREAT SERPL-MCNC: 0.74 MG/DL (ref 0.57–1)
EGFRCR SERPLBLD CKD-EPI 2021: 107.7 ML/MIN/1.73
EOSINOPHIL # BLD AUTO: 0.16 10*3/MM3 (ref 0–0.4)
EOSINOPHIL NFR BLD AUTO: 2.1 % (ref 0.3–6.2)
ERYTHROCYTE [DISTWIDTH] IN BLOOD BY AUTOMATED COUNT: 14.3 % (ref 12.3–15.4)
ESTRADIOL SERPL-MCNC: 103 PG/ML
FSH SERPL-ACNC: 7.8 MIU/ML
GLOBULIN SER CALC-MCNC: 2.3 GM/DL
GLUCOSE SERPL-MCNC: 90 MG/DL (ref 65–99)
HBA1C MFR BLD: 5.5 % (ref 4.8–5.6)
HCT VFR BLD AUTO: 40.1 % (ref 34–46.6)
HDLC SERPL-MCNC: 47 MG/DL (ref 40–60)
HGB BLD-MCNC: 12.9 G/DL (ref 12–15.9)
IMM GRANULOCYTES # BLD AUTO: 0.04 10*3/MM3 (ref 0–0.05)
IMM GRANULOCYTES NFR BLD AUTO: 0.5 % (ref 0–0.5)
LDLC SERPL CALC-MCNC: 93 MG/DL (ref 0–100)
LH SERPL-ACNC: 23.6 MIU/ML
LYMPHOCYTES # BLD AUTO: 1.61 10*3/MM3 (ref 0.7–3.1)
LYMPHOCYTES NFR BLD AUTO: 20.9 % (ref 19.6–45.3)
MCH RBC QN AUTO: 27.3 PG (ref 26.6–33)
MCHC RBC AUTO-ENTMCNC: 32.2 G/DL (ref 31.5–35.7)
MCV RBC AUTO: 84.8 FL (ref 79–97)
MONOCYTES # BLD AUTO: 0.52 10*3/MM3 (ref 0.1–0.9)
MONOCYTES NFR BLD AUTO: 6.7 % (ref 5–12)
NEUTROPHILS # BLD AUTO: 5.36 10*3/MM3 (ref 1.7–7)
NEUTROPHILS NFR BLD AUTO: 69.5 % (ref 42.7–76)
NRBC BLD AUTO-RTO: 0 /100 WBC (ref 0–0.2)
PLATELET # BLD AUTO: 277 10*3/MM3 (ref 140–450)
POTASSIUM SERPL-SCNC: 4.5 MMOL/L (ref 3.5–5.2)
PROGEST SERPL-MCNC: 0.7 NG/ML
PROLACTIN SERPL-MCNC: 15.4 NG/ML (ref 4.8–33.4)
PROT SERPL-MCNC: 6.2 G/DL (ref 6–8.5)
RBC # BLD AUTO: 4.73 10*6/MM3 (ref 3.77–5.28)
SODIUM SERPL-SCNC: 139 MMOL/L (ref 136–145)
T3 SERPL-MCNC: 148 NG/DL (ref 80–200)
T4 FREE SERPL-MCNC: 1.13 NG/DL (ref 0.93–1.7)
TESTOST FREE SERPL-MCNC: 1.8 PG/ML (ref 0–4.2)
TESTOST SERPL-MCNC: 32 NG/DL (ref 8–60)
TRIGL SERPL-MCNC: 139 MG/DL (ref 0–150)
TSH SERPL DL<=0.005 MIU/L-ACNC: 0.85 UIU/ML (ref 0.27–4.2)
VLDLC SERPL CALC-MCNC: 24 MG/DL (ref 5–40)
WBC # BLD AUTO: 7.71 10*3/MM3 (ref 3.4–10.8)

## 2024-05-29 ENCOUNTER — TRANSCRIBE ORDERS (OUTPATIENT)
Dept: ADMINISTRATIVE | Facility: HOSPITAL | Age: 39
End: 2024-05-29
Payer: COMMERCIAL

## 2024-05-29 DIAGNOSIS — N30.21 OTHER CHRONIC CYSTITIS WITH HEMATURIA: Primary | ICD-10-CM

## 2024-06-07 ENCOUNTER — APPOINTMENT (OUTPATIENT)
Dept: ULTRASOUND IMAGING | Facility: HOSPITAL | Age: 39
End: 2024-06-07
Payer: COMMERCIAL

## 2024-06-07 ENCOUNTER — TELEPHONE (OUTPATIENT)
Dept: OBSTETRICS AND GYNECOLOGY | Facility: CLINIC | Age: 39
End: 2024-06-07

## 2024-06-07 ENCOUNTER — HOSPITAL ENCOUNTER (EMERGENCY)
Facility: HOSPITAL | Age: 39
Discharge: HOME | End: 2024-06-07
Attending: EMERGENCY MEDICINE | Admitting: EMERGENCY MEDICINE
Payer: COMMERCIAL

## 2024-06-07 VITALS
TEMPERATURE: 97.9 F | RESPIRATION RATE: 16 BRPM | DIASTOLIC BLOOD PRESSURE: 76 MMHG | HEIGHT: 68 IN | WEIGHT: 293 LBS | BODY MASS INDEX: 44.41 KG/M2 | SYSTOLIC BLOOD PRESSURE: 133 MMHG | OXYGEN SATURATION: 98 % | HEART RATE: 74 BPM

## 2024-06-07 DIAGNOSIS — O03.9 MISCARRIAGE: Primary | ICD-10-CM

## 2024-06-07 LAB
HCG INTACT+B SERPL-ACNC: 9.45 MIU/ML
HOLD SPECIMEN: NORMAL
HOLD SPECIMEN: NORMAL
WHOLE BLOOD HOLD COAG: NORMAL
WHOLE BLOOD HOLD SPECIMEN: NORMAL

## 2024-06-07 PROCEDURE — 36415 COLL VENOUS BLD VENIPUNCTURE: CPT

## 2024-06-07 PROCEDURE — 99284 EMERGENCY DEPT VISIT MOD MDM: CPT

## 2024-06-07 PROCEDURE — 76817 TRANSVAGINAL US OBSTETRIC: CPT

## 2024-06-07 PROCEDURE — 84702 CHORIONIC GONADOTROPIN TEST: CPT | Performed by: EMERGENCY MEDICINE

## 2024-06-07 NOTE — TELEPHONE ENCOUNTER
Caller: Alice Adams    Relationship to patient: Self    Best call back number: [phone]    Chief complaint: NEW OB - NOT SEEN YET, WOKE UP WITH HEAVY BLEEDING/CRAMPING, LMP 5-6 (SAW DR. LARSEN 7/23    Patient directed to call 911 or go to their nearest emergency room.     Patient verbalized understanding: [x] Yes  [] No  If no, why?    Additional notes:CALLED CLINICAL PER CLINICAL HAVE PT GO TO ER, SHE WILL GO TO  ER

## 2024-06-10 NOTE — ED PROVIDER NOTES
Subjective   History of Present Illness  Patient presents complaining of vaginal bleeding.  Patient unsure how far along she is but she is estimating about 5 weeks based on her last menstrual cycle.  Patient is a .  Patient denies any previous issues with prior pregnancies.  Patient got up this morning and had some lower abdominal cramping bilaterally and had some bleeding.  Patient says the bleeding has progressed and when wiping she is seeing some large baseball size clots.  Patient contacted her OB/GYN and was advised to go to the emergency room.  Patient denies any recent trauma or injury.  Patient is otherwise been at her baseline health without any lifestyle changes or medication changes.      Review of Systems   All other systems reviewed and are negative.      Past Medical History:   Diagnosis Date    ADHD (attention deficit hyperactivity disorder)     Anemia  and     postpartum    Depression     Gestational hypertension  and     History of medical problems     Hypertension     Migraine 2008 occasional since    Obesity     PMS (premenstrual syndrome) normal    Pre-eclampsia in third trimester 10/28/2020    Urinary tract infection 2005 constantly    Varicella as a child very severe       No Known Allergies    Past Surgical History:   Procedure Laterality Date    WISDOM TOOTH EXTRACTION  2003       Family History   Problem Relation Age of Onset    Breast cancer Mother         BRCA -    Melanoma Father     Stroke Father         has had 2 strokes    Diabetes Father     Hypertension Father     Stroke Maternal Grandfather         I believe he  of a stroke    Diabetes Maternal Grandfather     Diabetes Maternal Grandmother     Prostate cancer Paternal Grandfather     Diabetes Paternal Grandfather     Hypertension Paternal Grandfather     Ovarian cancer Paternal Grandmother     Breast cancer Paternal Aunt         BRCA +    Breast cancer Paternal Aunt         BRCA +    Uterine cancer Paternal  Aunt     Hypertension Paternal Uncle        Social History     Socioeconomic History    Marital status:    Tobacco Use    Smoking status: Never     Passive exposure: Never    Smokeless tobacco: Never   Vaping Use    Vaping status: Never Used   Substance and Sexual Activity    Alcohol use: Not Currently    Drug use: Never    Sexual activity: Yes     Partners: Male     Birth control/protection: I.U.D.           Objective   Physical Exam  Vitals and nursing note reviewed.   HENT:      Head: Normocephalic.      Right Ear: External ear normal.      Left Ear: External ear normal.      Nose: Nose normal.      Mouth/Throat:      Pharynx: Oropharynx is clear.   Eyes:      Conjunctiva/sclera: Conjunctivae normal.   Cardiovascular:      Rate and Rhythm: Normal rate and regular rhythm.      Heart sounds: Normal heart sounds.   Pulmonary:      Effort: Pulmonary effort is normal.      Breath sounds: Normal breath sounds.   Abdominal:      General: Abdomen is flat. Bowel sounds are normal. There is no distension.      Palpations: Abdomen is soft.      Tenderness: There is no abdominal tenderness. There is no right CVA tenderness, left CVA tenderness or guarding.   Musculoskeletal:         General: No swelling.      Cervical back: Neck supple.   Skin:     General: Skin is warm and dry.      Capillary Refill: Capillary refill takes 2 to 3 seconds.   Neurological:      Mental Status: She is alert and oriented to person, place, and time.   Psychiatric:         Mood and Affect: Mood normal.         Behavior: Behavior normal.         Procedures           ED Course                                             Medical Decision Making  Ddx menstruation, implantation bleeding, normal pregnancy, miscarriage, dysfunctional uterine bleeding, ectopic pregnancy    Labs Reviewed  RAINBOW DRAW         Narrative: The following orders were created for panel order [place] Draw.                  Procedure                                Abnormality         Status                                     ---------                               -----------         ------                                     Green Top (Gel)[[id number]]                                  Final result                               Lavender Top[[id number]]                                     Final result                               Gold Top - SST[[id number]]                                   Final result                               Light Blue Top[[id number]]                                   Final result                                                 Please view results for these tests on the individual orders.  HCG, QUANTITATIVE, PREGNANCY         Narrative: HCG Ranges by Gestational Age                                    Females - non-pregnant premenopausal   </= 1mIU/mL HCG                  Females - postmenopausal               </= 7mIU/mL HCG                                    3 Weeks       5.4   -      72 mIU/mL                  4 Weeks      10.2   -     708 mIU/mL                  5 Weeks       217   -   8,245 mIU/mL                  6 Weeks       152   -  32,177 mIU/mL                  7 Weeks     4,059   - 153,767 mIU/mL                  8 Weeks    31,366   - 149,094 mIU/mL                  9 Weeks    59,109   - 135,901 mIU/mL                  10 Weeks   44,186   - 170,409 mIU/mL                  12 Weeks   27,107   - 201,615 mIU/mL                  14 Weeks   24,302   -  93,646 mIU/mL                  15 Weeks   12,540   -  69,747 mIU/mL                  16 Weeks    8,904   -  55,332 mIU/mL                  17 Weeks    8,240   -  51,793 mIU/mL                  18 Weeks    9,649   -  55,271 mIU/mL                    GREEN TOP  LAVENDER TOP  GOLD TOP - SST  LIGHT BLUE TOP    US Ob Transvaginal    Result Date: 6/7/2024  Impression: Normal sonographic appearance of the uterus. No intrauterine pregnancy or gestational sac visualized. Ovaries not seen due to overlying bowel  gas though no adnexal mass is seen. In the setting of a positive beta hCG, findings could be due to very early pregnancy, early pregnancy failure or nonvisualized ectopic pregnancy. Correlation with the patient's beta hCG level along with a short-term follow-up repeat ultrasound is recommended. Electronically Signed: Bhavya Vega MD  6/7/2024 10:12 AM EDT  Workstation ID: [id number]        Problems Addressed:  Miscarriage: complicated acute illness or injury    Amount and/or Complexity of Data Reviewed  Labs: ordered.  Radiology: ordered.        Final diagnoses:   Miscarriage       ED Disposition  ED Disposition       ED Disposition   Discharge    Condition   Stable    Comment   --               Waqar Smith, DO  [address] [address] [phone]               Medication List      No changes were made to your prescriptions during this visit.            Toño Mccray MD  06/10/24 0051

## 2024-06-11 ENCOUNTER — OFFICE VISIT (OUTPATIENT)
Dept: OBSTETRICS AND GYNECOLOGY | Facility: CLINIC | Age: 39
End: 2024-06-11
Payer: COMMERCIAL

## 2024-06-11 VITALS
SYSTOLIC BLOOD PRESSURE: 134 MMHG | BODY MASS INDEX: 44.41 KG/M2 | HEIGHT: 68 IN | WEIGHT: 293 LBS | DIASTOLIC BLOOD PRESSURE: 88 MMHG

## 2024-06-11 DIAGNOSIS — O02.1 MISSED ABORTION: ICD-10-CM

## 2024-06-11 DIAGNOSIS — N91.2 AMENORRHEA: Primary | ICD-10-CM

## 2024-06-11 PROBLEM — Z97.5 IUD (INTRAUTERINE DEVICE) IN PLACE: Status: RESOLVED | Noted: 2021-01-13 | Resolved: 2024-06-11

## 2024-06-11 LAB
B-HCG UR QL: NEGATIVE
BILIRUB BLD-MCNC: NEGATIVE MG/DL
CLARITY, POC: CLEAR
COLOR UR: YELLOW
EXPIRATION DATE: NORMAL
GLUCOSE UR STRIP-MCNC: NEGATIVE MG/DL
INTERNAL NEGATIVE CONTROL: NEGATIVE
INTERNAL POSITIVE CONTROL: POSITIVE
KETONES UR QL: NEGATIVE
LEUKOCYTE EST, POC: NEGATIVE
Lab: 55
NITRITE UR-MCNC: NEGATIVE MG/ML
PH UR: 5 [PH] (ref 5–8)
PROT UR STRIP-MCNC: NEGATIVE MG/DL
RBC # UR STRIP: NEGATIVE /UL
SP GR UR: 1 (ref 1–1.03)
UROBILINOGEN UR QL: NORMAL

## 2024-06-11 NOTE — PROGRESS NOTES
Was seen in ED recently for MAB and VB. HcG Was 9.4. Today feels okay. Does have ALYSE consult. Was excited about another pregnancy. Tearful but doing as best she can.       Last Appt:   Alice Adams is a 38 y.o. female new patient who presents for annual well woman exam. Periods are regular every 28-30 days, lasting a few days. ( Mirena IUD: wants removed today )       x2     OB History    No obstetric history on file.         Menarche:   Current contraception: Nothing   History of abnormal Pap smear: no  History of abnormal mammogram: no  Family history of uterine, colon or ovarian cancer:  BRCA+ family Hx ( She has tested negative)   Family history of breast cancer:  See above   H/o STDs: Denies   Last pap:  NILM   Gardasil:missed  SUSY: none    Health Maintenance   Topic Date Due    Annual Gynecologic Pelvic and Breast Exam  Never done    ANNUAL PHYSICAL  Never done    COVID-19 Vaccine (2023- season) Never done    BMI FOLLOWUP  2024    INFLUENZA VACCINE  2024    PAP SMEAR  2026    TDAP/TD VACCINES (4 - Td or Tdap) 2030    HEPATITIS C SCREENING  Completed    Pneumococcal Vaccine 0-64  Aged Out       Past Medical History:   Diagnosis Date    ADHD (attention deficit hyperactivity disorder)     Anemia  and     postpartum    Depression     Gestational hypertension  and     History of medical problems     Hypertension     Migraine 2008 occasional since    Obesity     PMS (premenstrual syndrome) normal    Pre-eclampsia in third trimester 10/28/2020    Urinary tract infection 2005 constantly    Varicella as a child very severe       Past Surgical History:   Procedure Laterality Date    WISDOM TOOTH EXTRACTION           Current Outpatient Medications:     busPIRone (BUSPAR) 5 MG tablet, Take 1 tablet by mouth 3 (Three) Times a Day., Disp: 90 tablet, Rfl: 2    escitalopram (Lexapro) 5 MG tablet, Take 1 tablet by mouth Daily., Disp: 30 tablet, Rfl: 2     "hydrOXYzine (ATARAX) 10 MG tablet, Take 1 tablet by mouth., Disp: , Rfl:     lisinopril (PRINIVIL,ZESTRIL) 10 MG tablet, Take 1 tablet by mouth Daily for 30 days., Disp: 30 tablet, Rfl: 0    No Known Allergies    Social History     Tobacco Use    Smoking status: Never     Passive exposure: Never    Smokeless tobacco: Never   Vaping Use    Vaping status: Never Used   Substance Use Topics    Alcohol use: Not Currently    Drug use: Never       Family History   Problem Relation Age of Onset    Breast cancer Mother         BRCA -    Melanoma Father     Stroke Father         has had 2 strokes    Diabetes Father     Hypertension Father     Stroke Maternal Grandfather         I believe he  of a stroke    Diabetes Maternal Grandfather     Diabetes Maternal Grandmother     Prostate cancer Paternal Grandfather     Diabetes Paternal Grandfather     Hypertension Paternal Grandfather     Ovarian cancer Paternal Grandmother     Breast cancer Paternal Aunt         BRCA +    Breast cancer Paternal Aunt         BRCA +    Uterine cancer Paternal Aunt     Hypertension Paternal Uncle        Review of Systems  Spotting     /88   Ht 172.7 cm (67.99\")   Wt (!) 141 kg (310 lb 12.8 oz)   BMI 47.27 kg/m²     Physical Exam  Constitutional:       Appearance: Normal appearance.   Cardiovascular:      Rate and Rhythm: Normal rate and regular rhythm.   Pulmonary:      Effort: Pulmonary effort is normal.      Breath sounds: Normal breath sounds.   Abdominal:      General: Abdomen is flat.      Palpations: Abdomen is soft.   Skin:     General: Skin is warm and dry.   Neurological:      Mental Status: She is alert and oriented to person, place, and time.   Psychiatric:         Behavior: Behavior normal.     GYN Ultrasound         Uterus - retroverted and appears normal in shape in contour      EL - 0.6 cm      Ovaries appear WNL      Comparative data - Yes      Waqar Smith DO  2024  10:26 EDT               Assessment/Plan   "     Diagnoses and all orders for this visit:    1. Amenorrhea (Primary)  -     POC Urinalysis Dipstick  -     POC Pregnancy, Urine    2. Missed     HcG Neg today and US WNL   O + no rhogam needed   ALYSE consult pending   Gentle weight loss recommended; may start Rx again       Waqar Smith DO      10:39 EDT

## 2024-06-24 DIAGNOSIS — E66.01 MORBID OBESITY WITH BMI OF 40.0-44.9, ADULT: ICD-10-CM

## 2024-06-24 RX ORDER — SEMAGLUTIDE 0.25 MG/.5ML
INJECTION, SOLUTION SUBCUTANEOUS WEEKLY
Qty: 4 ML | Refills: 0 | OUTPATIENT
Start: 2024-06-24

## 2024-06-25 RX ORDER — SEMAGLUTIDE 0.5 MG/.5ML
INJECTION, SOLUTION SUBCUTANEOUS
Qty: 4 ML | Refills: 0 | OUTPATIENT
Start: 2024-06-25

## 2024-07-10 ENCOUNTER — OFFICE VISIT (OUTPATIENT)
Dept: INTERNAL MEDICINE | Facility: CLINIC | Age: 39
End: 2024-07-10
Payer: COMMERCIAL

## 2024-07-10 VITALS
OXYGEN SATURATION: 97 % | DIASTOLIC BLOOD PRESSURE: 86 MMHG | HEIGHT: 68 IN | WEIGHT: 293 LBS | HEART RATE: 97 BPM | SYSTOLIC BLOOD PRESSURE: 132 MMHG | TEMPERATURE: 98.5 F | BODY MASS INDEX: 44.41 KG/M2

## 2024-07-10 DIAGNOSIS — Z71.3 DIETARY COUNSELING: ICD-10-CM

## 2024-07-10 DIAGNOSIS — E66.01 CLASS 3 SEVERE OBESITY DUE TO EXCESS CALORIES WITH SERIOUS COMORBIDITY AND BODY MASS INDEX (BMI) OF 45.0 TO 49.9 IN ADULT: ICD-10-CM

## 2024-07-10 DIAGNOSIS — N97.9 INFERTILITY, FEMALE: Primary | ICD-10-CM

## 2024-07-10 DIAGNOSIS — Z86.59 HISTORY OF DEPRESSION: ICD-10-CM

## 2024-07-10 PROCEDURE — 99214 OFFICE O/P EST MOD 30 MIN: CPT | Performed by: STUDENT IN AN ORGANIZED HEALTH CARE EDUCATION/TRAINING PROGRAM

## 2024-07-10 NOTE — PROGRESS NOTES
"Chief Complaint  Diet, weight loss, infertility, anxiety/depression  Subjective        Alice Adams presents to [facility]  History of Present Illness  History of Present Illness  Alice is a patient of Toño Vaughan MD, presenting for diet/management of weight loss, infertility, anxiety/depression.    Over the past year, she has been attempting to conceive, but over the past 2 months, she has experienced 2 miscarriages. She is currently under the care of a fertility specialist and an OB/GYN, Dr. Smith, at [facility] in [place]. Dr. Smith has suggested weight loss/Wegovy as a potential solution for her miscarriages.  The patient was previously under the care of a nutritionist and a weight loss specialist when living in in [place], during which she was prescribed metformin and Wegovy.  She denies a family history of multiple endocrine neoplasia type 2, thyroid cancer, or pancreatic cancer. She has no history of pancreatitis.  She will be following with the fertility specialist this week.    The patient began taking anxiety and depression medication when she experienced an anxiety attack.  She admits to inconsistent adherence to the medication regimen but has restarted.    Objective   Vital Signs:  /86   Pulse 97   Temp 98.5 °F (36.9 °C)   Ht 172.7 cm (67.99\")   Wt (!) 140 kg (309 lb 9.6 oz)   SpO2 97%   BMI 47.09 kg/m²   Estimated body mass index is 47.09 kg/m² as calculated from the following:    Height as of this encounter: 172.7 cm (67.99\").    Weight as of this encounter: 140 kg (309 lb 9.6 oz).             Physical Exam  Vitals reviewed.   Constitutional:       General: She is not in acute distress.     Appearance: Normal appearance.   HENT:      Head: Normocephalic and atraumatic.   Cardiovascular:      Rate and Rhythm: Normal rate and regular rhythm.      Heart sounds: Normal heart sounds. No murmur heard.     No friction rub. No gallop.   Pulmonary:      " Effort: Pulmonary effort is normal.      Breath sounds: Normal breath sounds. No wheezing, rhonchi or rales.   Abdominal:      Palpations: Abdomen is soft.   Neurological:      General: No focal deficit present.      Mental Status: She is alert. Mental status is at baseline.   Psychiatric:         Mood and Affect: Mood normal.         Behavior: Behavior normal.        Physical Exam      Result Review :          Results               Assessment and Plan     Diagnoses and all orders for this visit:    1. Infertility, female (Primary)    2. Dietary counseling    3. Class 3 severe obesity due to excess calories with serious comorbidity and body mass index (BMI) of 45.0 to 49.9 in adult  -     Discontinue: Semaglutide-Weight Management 0.25 MG/0.5ML solution auto-injector; Inject 0.5 mL under the skin into the appropriate area as directed Every 7 (Seven) Days.  Dispense: 2 mL; Refill: 0  -     Ambulatory Referral to Bariatric Surgery    4. History of depression      Assessment & Plan    1.  Infertility.  Patient was counseled to follow-up with fertility specialist regarding any medications she is taking for recommendations on most appropriate ones prior to conception.    2.  Dietary counseling/weight loss.  Reviewed recommendations regarding semaglutide and pregnancy which indicated no known fetal harm, although, use of weight loss medication is not recommended during pregnancy at all.  Discussed risk/benefit of Wegovy use prior to conception to help improve fertility.  With mutual decision making of risk/benefits, we decided to proceed with restarting Wegovy.  Patient agrees to talk with fertility specialist prior to initiating Wegovy for their recommendation as well.  Patient was strongly counseled to discontinue Wegovy use if she discovered she is pregnant.  Patient was also open to referral to the bariatric clinic nonsurgical weight loss group for further counseling.  Referral placed.    2. Anxiety and  depression.  The patient was informed that BuSpar and Lexapro are generally safe during the first trimester of pregnancy.  It is my feeling that the benefits outweigh the risk of continuing these medications at this time.  Advised patient to discuss with her OB/GYN if/when she should discontinue these medications during her pregnancy.    07/11/24  Patient informed us that she had talked with her fertility physician and after thought, she has decided not to start wegovy until after she gets pregnant and delivers.  She will work on losing weight the best she can naturally at this time.        I spent 30 minutes caring for Alice on this date of service. This time includes time spent by me in the following activities:preparing for the visit, reviewing tests, obtaining and/or reviewing a separately obtained history, performing a medically appropriate examination and/or evaluation , counseling and educating the patient/family/caregiver, ordering medications, tests, or procedures, documenting information in the medical record, and reviewing medical references with patient for safe medications during pregnancy.  Follow Up     Return if symptoms worsen or fail to improve.  Patient was given instructions and counseling regarding her condition or for health maintenance advice. Please see specific information pulled into the AVS if appropriate.     Patient or patient representative verbalized consent for the use of Ambient Listening during the visit with  Galindo Barbosa MD for chart documentation. 7/11/2024  21:30 EDT

## 2024-07-22 ENCOUNTER — TELEPHONE (OUTPATIENT)
Dept: INTERNAL MEDICINE | Facility: CLINIC | Age: 39
End: 2024-07-22
Payer: COMMERCIAL

## 2024-08-04 PROBLEM — R31.9 URINARY TRACT INFECTION WITH HEMATURIA: Status: ACTIVE | Noted: 2024-08-04

## 2024-08-04 PROBLEM — N39.0 URINARY TRACT INFECTION WITH HEMATURIA: Status: ACTIVE | Noted: 2024-08-04

## 2024-08-19 RX ORDER — ESTAZOLAM 2 MG/1
1 TABLET ORAL DAILY
Qty: 21 TABLET | OUTPATIENT
Start: 2024-08-19

## 2024-09-16 ENCOUNTER — OFFICE VISIT (OUTPATIENT)
Dept: OBSTETRICS AND GYNECOLOGY | Facility: CLINIC | Age: 39
End: 2024-09-16
Payer: COMMERCIAL

## 2024-09-16 VITALS — HEIGHT: 68 IN | SYSTOLIC BLOOD PRESSURE: 130 MMHG | BODY MASS INDEX: 45.63 KG/M2 | DIASTOLIC BLOOD PRESSURE: 86 MMHG

## 2024-09-16 DIAGNOSIS — Z13.89 SCREENING FOR GENITOURINARY CONDITION: Primary | ICD-10-CM

## 2024-09-16 LAB
B-HCG UR QL: NEGATIVE
BILIRUB BLD-MCNC: NEGATIVE MG/DL
CLARITY, POC: CLEAR
COLOR UR: YELLOW
EXPIRATION DATE: NORMAL
GLUCOSE UR STRIP-MCNC: NEGATIVE MG/DL
INTERNAL NEGATIVE CONTROL: NORMAL
INTERNAL POSITIVE CONTROL: NORMAL
KETONES UR QL: NEGATIVE
LEUKOCYTE EST, POC: NEGATIVE
Lab: NORMAL
NITRITE UR-MCNC: NEGATIVE MG/ML
PH UR: 5 [PH] (ref 5–8)
PROT UR STRIP-MCNC: NEGATIVE MG/DL
RBC # UR STRIP: NEGATIVE /UL
SP GR UR: 1 (ref 1–1.03)
UROBILINOGEN UR QL: NORMAL

## 2024-09-16 PROCEDURE — 81002 URINALYSIS NONAUTO W/O SCOPE: CPT | Performed by: STUDENT IN AN ORGANIZED HEALTH CARE EDUCATION/TRAINING PROGRAM

## 2024-09-16 PROCEDURE — 99214 OFFICE O/P EST MOD 30 MIN: CPT | Performed by: STUDENT IN AN ORGANIZED HEALTH CARE EDUCATION/TRAINING PROGRAM

## 2024-09-16 PROCEDURE — 81025 URINE PREGNANCY TEST: CPT | Performed by: STUDENT IN AN ORGANIZED HEALTH CARE EDUCATION/TRAINING PROGRAM

## 2024-09-16 RX ORDER — SEMAGLUTIDE 0.25 MG/.5ML
INJECTION, SOLUTION SUBCUTANEOUS
COMMUNITY
Start: 2024-07-23 | End: 2024-09-16

## 2025-01-14 ENCOUNTER — TELEPHONE (OUTPATIENT)
Dept: INTERNAL MEDICINE | Facility: CLINIC | Age: 40
End: 2025-01-14

## 2025-01-14 NOTE — TELEPHONE ENCOUNTER
Caller: Alice Adams    Relationship: Self    Best call back number: [phone]    What is the best time to reach you: ANY    Who are you requesting to speak with (clinical staff, provider,  specific staff member): N/A    Do you know the name of the person who called: N/A    What was the call regarding: PATIENT CALLING IN TO INFORM Toño Vaughan MD  THAT SHE IS BEGINNING THE PROCESS OF GETTING BARIATRIC SURGERY, AND WANTED HIM TO BE INFORMED. PATIENT WAS UNSURE IF HE WOULD WANT TO SEE HER OR IF IT WAS OKAY TO JUST UPDATE HIM. PLEASE ADVISE.     Is it okay if the provider responds through MyChart: YES

## 2025-01-28 RX ORDER — BUPROPION HYDROCHLORIDE 150 MG/1
150 TABLET ORAL DAILY
COMMUNITY

## 2025-02-05 ENCOUNTER — PREP FOR SURGERY (OUTPATIENT)
Dept: OTHER | Facility: HOSPITAL | Age: 40
End: 2025-02-05
Payer: COMMERCIAL

## 2025-02-05 ENCOUNTER — HOSPITAL ENCOUNTER (OUTPATIENT)
Dept: CARDIOLOGY | Facility: HOSPITAL | Age: 40
Discharge: HOME | End: 2025-02-05
Payer: COMMERCIAL

## 2025-02-05 ENCOUNTER — LAB (OUTPATIENT)
Dept: LAB | Facility: HOSPITAL | Age: 40
End: 2025-02-05
Payer: COMMERCIAL

## 2025-02-05 ENCOUNTER — HOSPITAL ENCOUNTER (OUTPATIENT)
Dept: GENERAL RADIOLOGY | Facility: HOSPITAL | Age: 40
Discharge: HOME | End: 2025-02-05
Payer: COMMERCIAL

## 2025-02-05 ENCOUNTER — CONSULT (OUTPATIENT)
Dept: BARIATRICS/WEIGHT MGMT | Facility: CLINIC | Age: 40
End: 2025-02-05
Payer: COMMERCIAL

## 2025-02-05 VITALS
DIASTOLIC BLOOD PRESSURE: 85 MMHG | BODY MASS INDEX: 44.41 KG/M2 | TEMPERATURE: 98.1 F | SYSTOLIC BLOOD PRESSURE: 136 MMHG | WEIGHT: 293 LBS | HEART RATE: 82 BPM | HEIGHT: 68 IN

## 2025-02-05 DIAGNOSIS — E66.01 OBESITY, CLASS III, BMI 40-49.9 (MORBID OBESITY): ICD-10-CM

## 2025-02-05 DIAGNOSIS — Z01.818 PRE-OP EVALUATION: Primary | ICD-10-CM

## 2025-02-05 DIAGNOSIS — E66.01 OBESITY, CLASS III, BMI 40-49.9 (MORBID OBESITY): Primary | ICD-10-CM

## 2025-02-05 DIAGNOSIS — R53.82 CHRONIC FATIGUE: ICD-10-CM

## 2025-02-05 DIAGNOSIS — Z01.818 PRE-OP EVALUATION: ICD-10-CM

## 2025-02-05 DIAGNOSIS — Z71.3 DIETARY COUNSELING: ICD-10-CM

## 2025-02-05 PROBLEM — Z80.3 FAMILY HISTORY OF BREAST CANCER: Status: RESOLVED | Noted: 2017-02-14 | Resolved: 2025-02-05

## 2025-02-05 PROBLEM — N93.9 ABNORMAL UTERINE BLEEDING: Status: ACTIVE | Noted: 2024-08-29

## 2025-02-05 PROBLEM — Z83.3 FAMILY HISTORY OF DIABETES MELLITUS: Status: RESOLVED | Noted: 2017-02-14 | Resolved: 2025-02-05

## 2025-02-05 PROBLEM — Z82.79 FAMILY HISTORY OF SPINA BIFIDA: Status: RESOLVED | Noted: 2017-02-14 | Resolved: 2025-02-05

## 2025-02-05 PROBLEM — N97.9 INFERTILITY, FEMALE: Status: ACTIVE | Noted: 2025-02-05

## 2025-02-05 PROBLEM — E66.813 OBESITY, CLASS III, BMI 40-49.9 (MORBID OBESITY): Status: ACTIVE | Noted: 2017-02-14

## 2025-02-05 PROBLEM — Z86.59 HISTORY OF DEPRESSION: Status: RESOLVED | Noted: 2017-02-14 | Resolved: 2025-02-05

## 2025-02-05 PROBLEM — R06.83 SNORING: Status: ACTIVE | Noted: 2025-02-05

## 2025-02-05 PROBLEM — L40.9 PSORIASIS: Status: ACTIVE | Noted: 2025-02-05

## 2025-02-05 LAB
QT INTERVAL: 392 MS
QTC INTERVAL: 430 MS

## 2025-02-05 PROCEDURE — 71046 X-RAY EXAM CHEST 2 VIEWS: CPT

## 2025-02-05 PROCEDURE — 93005 ELECTROCARDIOGRAM TRACING: CPT | Performed by: PHYSICIAN ASSISTANT

## 2025-02-05 PROCEDURE — 84425 ASSAY OF VITAMIN B-1: CPT

## 2025-02-05 PROCEDURE — 36415 COLL VENOUS BLD VENIPUNCTURE: CPT

## 2025-02-05 RX ORDER — PRENATAL VIT NO.126/IRON/FOLIC 28MG-0.8MG
TABLET ORAL DAILY
COMMUNITY

## 2025-02-05 NOTE — H&P (VIEW-ONLY)
[facility]ATR[facility] BARIATRIC SURGERY  [address]  [facility] [address] [phone]  [address]  [facility] [address] [phone]  Dept: [phone]  2025      Alice Adams.  [id number]  [id number]  1985  female      Chief Complaint of weight gain; unable to maintain weight loss    History of Present Illness:   Alice is a 39 y.o. female who presents today for evaluation, education and consultation regarding weight loss surgery. The patient is interested in the sleeve gastrectomy.      Diet History:Alice has been overweight for at least 29 years, has been 35 pounds or more overweight for at least 15 years, has been 100 pounds or more overweight for 8 or more years and started dieting at age 11.  The most weight Alice lost was 60 pounds on WW and keto diet and maintained the weight loss for 1-2 yrs. Alice describes her eating habits as snacking between meals and drinking soda and carbonated beverages. Alice Adams has tried Atkins, Carolina Velasquez, [place], Weight Watchers, Fasting, exercising, prescription medications, Ketogenic or high fat, and high protein, low carb among others with success of losing up to 60 pounds, but in each instance regained the weight.  Her maximum lifetime weight is 320 pounds.    See dietician documentation for complete history.    Goal wt: 180lbs    Working with nutritionist 2yrs in [place]  Issues with fertility these past few years- recently stopped fertility treatments    Works in [employer]- work at home    Bariatric Surgery Evaluation: The patient is being seen for an initial visit for bariatric surgery evaluation.     Bariatric Co-morbidities:  sleep disturbances with possible sleep apnea    Patient Active Problem List   Diagnosis    ADD (attention deficit disorder)    Obesity, Class III, BMI 40-49.9 (morbid obesity)    Levator spasm    Pre-eclampsia in third trimester    Missed      Urinary tract infection with hematuria    Abnormal uterine bleeding    Chronic fatigue    Snoring    Infertility, female    Psoriasis       Past Medical History:   Diagnosis Date    ADHD (attention deficit hyperactivity disorder)     Anemia  and     postpartum    Depression     History of medical problems     Infertility, female 2025    Migraine 2008 occasional since    Miscarriage     Obesity     PMS (premenstrual syndrome) normal    Urinary tract infection 2005 constantly    Varicella as a child very severe       Past Surgical History:   Procedure Laterality Date    WISDOM TOOTH EXTRACTION         No Known Allergies      Current Outpatient Medications:     prenatal vitamin (prenatal, CLASSIC, vitamin) tablet, Take  by mouth Daily., Disp: , Rfl:     Social History     Socioeconomic History    Marital status:    Tobacco Use    Smoking status: Never     Passive exposure: Never    Smokeless tobacco: Never   Vaping Use    Vaping status: Never Used   Substance and Sexual Activity    Alcohol use: Not Currently    Drug use: Never    Sexual activity: Yes     Partners: Male     Birth control/protection: I.U.D.       Family History   Problem Relation Age of Onset    Breast cancer Mother         BRCA -    Obesity Mother     Stroke Mother     Sleep apnea Mother     Melanoma Father     Stroke Father         has had 2 strokes    Diabetes Father     Hypertension Father     Obesity Father     Sleep apnea Father     Breast cancer Paternal Aunt         BRCA +    Breast cancer Paternal Aunt         BRCA +    Uterine cancer Paternal Aunt     Hypertension Paternal Uncle     Diabetes Maternal Grandmother     Stroke Maternal Grandfather         I believe he  of a stroke    Diabetes Maternal Grandfather     Ovarian cancer Paternal Grandmother     Prostate cancer Paternal Grandfather     Diabetes Paternal Grandfather     Hypertension Paternal Grandfather          Review of Systems:  Review of Systems    Constitutional:  Positive for appetite change and fatigue. Negative for unexpected weight change.   HENT: Negative.     Eyes: Negative.    Respiratory: Negative.     Cardiovascular: Negative.  Negative for leg swelling.   Gastrointestinal:  Negative for abdominal distention, abdominal pain, constipation, diarrhea, nausea and vomiting.   Genitourinary:  Negative for difficulty urinating, frequency and urgency.   Musculoskeletal:  Negative for back pain.   Skin: Negative.    Psychiatric/Behavioral: Negative.     All other systems reviewed and are negative.      Physical Exam:  Vital Signs:  Weight: (!) 144 kg (317 lb)   Body mass index is 48.6 kg/m².  Temp: 98.1 °F (36.7 °C)   Heart Rate: 82   BP: 136/85     Physical Exam  Vitals reviewed.   Constitutional:       General: She is not in acute distress.     Appearance: Normal appearance. She is morbidly obese.   HENT:      Head: Normocephalic and atraumatic.      Mouth/Throat:      Mouth: Mucous membranes are moist.   Eyes:      General: No scleral icterus.     Pupils: Pupils are equal, round, and reactive to light.   Cardiovascular:      Rate and Rhythm: Normal rate and regular rhythm.      Heart sounds: Normal heart sounds. No murmur heard.     No gallop.   Pulmonary:      Effort: Pulmonary effort is normal. No respiratory distress.      Breath sounds: Normal breath sounds.   Abdominal:      General: Abdomen is flat. Bowel sounds are normal. There is no distension.      Palpations: Abdomen is soft. There is no mass.      Tenderness: There is no abdominal tenderness. There is no guarding.   Musculoskeletal:         General: Normal range of motion.      Cervical back: Normal range of motion and neck supple.   Skin:     General: Skin is warm and dry.   Neurological:      General: No focal deficit present.      Mental Status: She is alert and oriented to person, place, and time.   Psychiatric:         Mood and Affect: Mood normal.         Behavior: Behavior normal.             Assessment:         Alice Adams is a 39 y.o. year old female with medically complicated severe obesity. Weight: (!) 144 kg (317 lb), Body mass index is 48.6 kg/m². and weight related problems including sleep disturbances with possible sleep apnea.    I explained in detail, potential surgical options of interest to the patient including the RNY gastric bypass, sleeve gastrectomy, and gastric band while considering the patient's medical history. At this time, the patient expressed interest in the sleeve gastrectomy.  All of those procedures can be performed laparoscopically but there is a chance to convert to open if any technical challenges or complications do occur.  Bariatric surgery is not cosmetic surgery but rather a tool to help a patient make a life-long commitment lifestyle changes including diet, exercise, behavior changes, and taking supplemental vitamins and minerals. The risks, benefits, alternatives, and potential complications of all of the procedures were explained in detail including but not limited to failure to lose weight or gain weight and change in body image, metabolic complications. The patient was informed that surgery is a tool and requires lifestyle change including dietary modification to be successful. The patient was made aware of the need for vitamin supplementation after surgery due to the risk of deficiencies including but not limited to calcium, thiamine, vitamin B12, folate, iron, and anemia.    The patient was advised to start a high protein, low fat and low carbohydrate diet. The patient was given individualized information by our dietician along with handouts. The patient was encouraged to start routine exercise including but not limited to 150 minutes per week. The patient received a resistance band along with a handout of exercises.     The patient was given information regarding the CYNTHIA educational video. CYNTHIA is an internet based educational video which explains  the surgical procedure and answers basic questions regarding the procedure. The patient was provided with instructions and a password to watch the video.    Due to the patient's BMI, history and co-morbidities they are at a high risk for surgery and will obtain the following:  -The patient has been advised that a letter of medical support and a history and physical must be obtained from her primary care physician. The patient was given a copy of a sample form, that will suffice as their letter to take to their primary are provider.  -A referral for pre-operative psychological evaluation was ordered for the patient to evaluate candidacy as well as provide mental health support, should it be warranted before or after surgery.    -Pre-operative testing will be ordered to include: B1 will be drawn, CXR, EKG. If EKG abnormal, will send for cardiac clearance.    Previous results of testing were reviewed including labs from 4/25/24- CBC, TSH, CMP, HgA1c.     -Alice Adams will be set up for a pre-operative diagnostic esophagogastroduodenoscopy with biopsy for evaluation. The risks and benefits of the procedure were discussed with the patient in detail and all questions were answered.  Possibility of perforation, bleeding, aspiration, anoxic brain injury, respiratory and/or cardiac arrest and death were discussed. The patient received handouts regarding her instructions and all questions were answered.      Alice Adams was screened for sleep apnea in our office today and based on their results 4/8 - will send for home sleep study. The risks, as they relate to chronic hypercapnia r/t untreated JESSICA were discussed with the patient and they verbalized understanding.     The patient understands the surgical procedures and the different surgical options that are available.  She understands the lifestyle changes that would be required after surgery and has agreed to participate in a pre-operative and postoperative weight management  program.  She also expressed understanding of possible risks, had several questions answered and desires to proceed.    I think she is a good candidate for this surgery, and is interested in a sleeve gastrectomy.    Encounter Diagnoses   Name Primary?    Obesity, Class III, BMI 40-49.9 (morbid obesity) Yes    Chronic fatigue        Plan:    The consultation plan was reviewed with the patient.  Patient will have evaluations and follow up with bariatric dieticians and a psychologist before undergoing a multidisciplinary review of her candidacy.  We also discussed the weight loss requirement and rationale, as well as other program requirements to ensure the safest approach to surgery. We spent time discussing different surgical procedures and plan of care throughout their lifespan to ensure long term success in achieving and maintaining a healthier weight. Patient will proceed with preoperative lab work, radiology, and endoscopy after obtaining agreed upon clearances and letter of support from their primary care provider.     As this patient is a female of childbearing age, she was counseled regarding conception and pregnancy after surgery. Patient was advised that conception and pregnancy in the first 18 months post surgery is not advised due to increased metabolic demands required during pregnancy as well as increased risk of nutrient and vitamin deficiencies which could jeopardize fetal development and birth weight.     Total encounter exceeded 60 minutes including reviewing their chart/outside medical records/previous visits, face to face time obtaining medical history and physical, reviewing surgical options and answering any questions, discussing pre-operative plan and requirements along with care coordination.         Heide Chowdary PA-C  2/5/2025

## 2025-02-05 NOTE — PROGRESS NOTES
"Metabolic and Bariatric Surgery Nutrition Assessment    Patient Name: Alice Adams    YOB: 1985   Age: 39 y.o.  Sex: female  MRN: [id number]     ASSESSMENT    Procedure considering: sleeve    Anthropometric Measurements  Estimated body mass index is 48.6 kg/m² as calculated from the following:    Height as of an earlier encounter on 2/5/25: 172 cm (67.72\").    Weight as of an earlier encounter on 2/5/25: 144 kg (317 lb).    Patient Goals and Expectations                        Patient's stated weight goal: 180-185 lbs  Reasons for desiring weight loss: health, quality of life     Medical History  Past Medical History:   Diagnosis Date    ADHD (attention deficit hyperactivity disorder)     Anemia 2017 and 2020    postpartum    Depression     History of medical problems     Migraine 2008 occasional since    Miscarriage     Obesity     PMS (premenstrual syndrome) normal    Urinary tract infection 2005 constantly    Varicella as a child very severe     Past Surgical History:   Procedure Laterality Date    WISDOM TOOTH EXTRACTION  2003       Weight History   Highest adult weight: 322 lbs   Lowest adult weight: 170 lbs   Onset of obesity: Always been overweight; dieting since age 14    Possible triggers or life events that may have led to weight gain:   Genetics, family culture and lifestyle, pregnancy, sedentary job     Previous Weight Loss Efforts   Patient has tried to lose weight in the past including:   Weight Watchers, Carolina Velasquez, Nutrisystem, Optifast, Optavia, Herbalife, keto, Atkins, [place], calorie counting, Slim Fast, fasting, RD supervised, prescription medications and exercise.       Patient has lost up to 70 lbs on diets, but was unable to maintain this long term.        Diet History   Patient is eating 3 meals and 2 snacks daily. Working from home.  had surgery 1.5 years ago. Doing regular meal prep.       24 Hour Recall   Breakfast: Coffee, eggs   Lunch: Chicken thigh and bell " peppers, low carb tortilla    Dinner: Lean protein (salmon/cod/chicken), rice, vegetables    Snacks: Cutie orange mid- morning, protein yogurt or a dumpling    Beverages: Water, flavored water, coffee        Eating out: 0-1 times per week    Vitamins/supplements: Prenatal vitamins    Dietary restrictions: NKFA      Patient identifies problem areas to be:   Physical hunger, inactivity.      Physical Activity   Work-related activity: Sedentary    Planned exercise: Walking, but not much recently with cold weather   Barriers to activity: None      DIAGNOSIS   .  Nutrition problem statement: Obesity related to multifactorial biochemical, behavioral and environmental contributors to disease as evidenced by BMI 48.60 kg/m²    INTERVENTION    Pre and post op nutrition education and coaching for behavior change completed. Program materials provided. Emphasized the importance of taking in at least 70 g protein and 64 oz fluid daily. Discussed personal habits and lifestyle behaviors that may influence weight loss efforts. Self monitoring strategies such as keeping a food journal were encouraged. Patient verbalized understanding and questions were answered.    Patient will be evaluated by multidisciplinary team before undergoing a review of their candidacy. Additional nutrition counseling available and encouraged both pre and post op. Patient demonstrated a good comprehension of diet requirements and commitment to make healthy changes.     Alice Adams appears to be a suitable candidate for bariatric surgery from a nutritional standpoint.    MONITORING & EVALUATION    Goals/Plan:   Continue high protein, low fat, low carbohydrate diet  Practice mindful eating, slowing down and limiting distractions      Total time spent during this encounter today exceeded 30 minutes and includes preparing for the visit, reviewing tests, counseling and educating the patient/family/caregiver and documenting information in the medical  record.    Electronically signed by:  Hortensia Zavaleta RD   02/05/25 08:38 EST

## 2025-02-05 NOTE — PROGRESS NOTES
[facility]IATRIC SURGERY  [address]  [facility] [address] [phone]  [address]  [facility] [address] [phone]  Dept: [phone]  2025      Alice Adams.  [id number]  [id number]  1985  female      Chief Complaint of weight gain; unable to maintain weight loss    History of Present Illness:   Alice is a 39 y.o. female who presents today for evaluation, education and consultation regarding weight loss surgery. The patient is interested in the sleeve gastrectomy.      Diet History:Alice has been overweight for at least 29 years, has been 35 pounds or more overweight for at least 15 years, has been 100 pounds or more overweight for 8 or more years and started dieting at age 11.  The most weight Alice lost was 60 pounds on WW and keto diet and maintained the weight loss for 1-2 yrs. Alice describes her eating habits as snacking between meals and drinking soda and carbonated beverages. Alice Adams has tried Atkins, Carolina Velasquez, [place], Weight Watchers, Fasting, exercising, prescription medications, Ketogenic or high fat, and high protein, low carb among others with success of losing up to 60 pounds, but in each instance regained the weight.  Her maximum lifetime weight is 320 pounds.    See dietician documentation for complete history.    Goal wt: 180lbs    Working with nutritionist 2yrs in [place]  Issues with fertility these past few years- recently stopped fertility treatments    Works in [employer]- work at home    Bariatric Surgery Evaluation: The patient is being seen for an initial visit for bariatric surgery evaluation.     Bariatric Co-morbidities:  sleep disturbances with possible sleep apnea    Patient Active Problem List   Diagnosis    ADD (attention deficit disorder)    Obesity, Class III, BMI 40-49.9 (morbid obesity)    Levator spasm    Pre-eclampsia in third trimester    Missed      Urinary tract infection with hematuria    Abnormal uterine bleeding    Chronic fatigue    Snoring    Infertility, female    Psoriasis       Past Medical History:   Diagnosis Date    ADHD (attention deficit hyperactivity disorder)     Anemia  and     postpartum    Depression     History of medical problems     Infertility, female 2025    Migraine 2008 occasional since    Miscarriage     Obesity     PMS (premenstrual syndrome) normal    Urinary tract infection 2005 constantly    Varicella as a child very severe       Past Surgical History:   Procedure Laterality Date    WISDOM TOOTH EXTRACTION         No Known Allergies      Current Outpatient Medications:     prenatal vitamin (prenatal, CLASSIC, vitamin) tablet, Take  by mouth Daily., Disp: , Rfl:     Social History     Socioeconomic History    Marital status:    Tobacco Use    Smoking status: Never     Passive exposure: Never    Smokeless tobacco: Never   Vaping Use    Vaping status: Never Used   Substance and Sexual Activity    Alcohol use: Not Currently    Drug use: Never    Sexual activity: Yes     Partners: Male     Birth control/protection: I.U.D.       Family History   Problem Relation Age of Onset    Breast cancer Mother         BRCA -    Obesity Mother     Stroke Mother     Sleep apnea Mother     Melanoma Father     Stroke Father         has had 2 strokes    Diabetes Father     Hypertension Father     Obesity Father     Sleep apnea Father     Breast cancer Paternal Aunt         BRCA +    Breast cancer Paternal Aunt         BRCA +    Uterine cancer Paternal Aunt     Hypertension Paternal Uncle     Diabetes Maternal Grandmother     Stroke Maternal Grandfather         I believe he  of a stroke    Diabetes Maternal Grandfather     Ovarian cancer Paternal Grandmother     Prostate cancer Paternal Grandfather     Diabetes Paternal Grandfather     Hypertension Paternal Grandfather          Review of Systems:  Review of Systems    Constitutional:  Positive for appetite change and fatigue. Negative for unexpected weight change.   HENT: Negative.     Eyes: Negative.    Respiratory: Negative.     Cardiovascular: Negative.  Negative for leg swelling.   Gastrointestinal:  Negative for abdominal distention, abdominal pain, constipation, diarrhea, nausea and vomiting.   Genitourinary:  Negative for difficulty urinating, frequency and urgency.   Musculoskeletal:  Negative for back pain.   Skin: Negative.    Psychiatric/Behavioral: Negative.     All other systems reviewed and are negative.      Physical Exam:  Vital Signs:  Weight: (!) 144 kg (317 lb)   Body mass index is 48.6 kg/m².  Temp: 98.1 °F (36.7 °C)   Heart Rate: 82   BP: 136/85     Physical Exam  Vitals reviewed.   Constitutional:       General: She is not in acute distress.     Appearance: Normal appearance. She is morbidly obese.   HENT:      Head: Normocephalic and atraumatic.      Mouth/Throat:      Mouth: Mucous membranes are moist.   Eyes:      General: No scleral icterus.     Pupils: Pupils are equal, round, and reactive to light.   Cardiovascular:      Rate and Rhythm: Normal rate and regular rhythm.      Heart sounds: Normal heart sounds. No murmur heard.     No gallop.   Pulmonary:      Effort: Pulmonary effort is normal. No respiratory distress.      Breath sounds: Normal breath sounds.   Abdominal:      General: Abdomen is flat. Bowel sounds are normal. There is no distension.      Palpations: Abdomen is soft. There is no mass.      Tenderness: There is no abdominal tenderness. There is no guarding.   Musculoskeletal:         General: Normal range of motion.      Cervical back: Normal range of motion and neck supple.   Skin:     General: Skin is warm and dry.   Neurological:      General: No focal deficit present.      Mental Status: She is alert and oriented to person, place, and time.   Psychiatric:         Mood and Affect: Mood normal.         Behavior: Behavior normal.             Assessment:         Alice Adams is a 39 y.o. year old female with medically complicated severe obesity. Weight: (!) 144 kg (317 lb), Body mass index is 48.6 kg/m². and weight related problems including sleep disturbances with possible sleep apnea.    I explained in detail, potential surgical options of interest to the patient including the RNY gastric bypass, sleeve gastrectomy, and gastric band while considering the patient's medical history. At this time, the patient expressed interest in the sleeve gastrectomy.  All of those procedures can be performed laparoscopically but there is a chance to convert to open if any technical challenges or complications do occur.  Bariatric surgery is not cosmetic surgery but rather a tool to help a patient make a life-long commitment lifestyle changes including diet, exercise, behavior changes, and taking supplemental vitamins and minerals. The risks, benefits, alternatives, and potential complications of all of the procedures were explained in detail including but not limited to failure to lose weight or gain weight and change in body image, metabolic complications. The patient was informed that surgery is a tool and requires lifestyle change including dietary modification to be successful. The patient was made aware of the need for vitamin supplementation after surgery due to the risk of deficiencies including but not limited to calcium, thiamine, vitamin B12, folate, iron, and anemia.    The patient was advised to start a high protein, low fat and low carbohydrate diet. The patient was given individualized information by our dietician along with handouts. The patient was encouraged to start routine exercise including but not limited to 150 minutes per week. The patient received a resistance band along with a handout of exercises.     The patient was given information regarding the CYNTHIA educational video. CYNTHIA is an internet based educational video which explains  the surgical procedure and answers basic questions regarding the procedure. The patient was provided with instructions and a password to watch the video.    Due to the patient's BMI, history and co-morbidities they are at a high risk for surgery and will obtain the following:  -The patient has been advised that a letter of medical support and a history and physical must be obtained from her primary care physician. The patient was given a copy of a sample form, that will suffice as their letter to take to their primary are provider.  -A referral for pre-operative psychological evaluation was ordered for the patient to evaluate candidacy as well as provide mental health support, should it be warranted before or after surgery.    -Pre-operative testing will be ordered to include: B1 will be drawn, CXR, EKG. If EKG abnormal, will send for cardiac clearance.    Previous results of testing were reviewed including labs from 4/25/24- CBC, TSH, CMP, HgA1c.     -Alice Adams will be set up for a pre-operative diagnostic esophagogastroduodenoscopy with biopsy for evaluation. The risks and benefits of the procedure were discussed with the patient in detail and all questions were answered.  Possibility of perforation, bleeding, aspiration, anoxic brain injury, respiratory and/or cardiac arrest and death were discussed. The patient received handouts regarding her instructions and all questions were answered.      Alice Adams was screened for sleep apnea in our office today and based on their results 4/8 - will send for home sleep study. The risks, as they relate to chronic hypercapnia r/t untreated JESSICA were discussed with the patient and they verbalized understanding.     The patient understands the surgical procedures and the different surgical options that are available.  She understands the lifestyle changes that would be required after surgery and has agreed to participate in a pre-operative and postoperative weight management  program.  She also expressed understanding of possible risks, had several questions answered and desires to proceed.    I think she is a good candidate for this surgery, and is interested in a sleeve gastrectomy.    Encounter Diagnoses   Name Primary?    Obesity, Class III, BMI 40-49.9 (morbid obesity) Yes    Chronic fatigue        Plan:    The consultation plan was reviewed with the patient.  Patient will have evaluations and follow up with bariatric dieticians and a psychologist before undergoing a multidisciplinary review of her candidacy.  We also discussed the weight loss requirement and rationale, as well as other program requirements to ensure the safest approach to surgery. We spent time discussing different surgical procedures and plan of care throughout their lifespan to ensure long term success in achieving and maintaining a healthier weight. Patient will proceed with preoperative lab work, radiology, and endoscopy after obtaining agreed upon clearances and letter of support from their primary care provider.     As this patient is a female of childbearing age, she was counseled regarding conception and pregnancy after surgery. Patient was advised that conception and pregnancy in the first 18 months post surgery is not advised due to increased metabolic demands required during pregnancy as well as increased risk of nutrient and vitamin deficiencies which could jeopardize fetal development and birth weight.     Total encounter exceeded 60 minutes including reviewing their chart/outside medical records/previous visits, face to face time obtaining medical history and physical, reviewing surgical options and answering any questions, discussing pre-operative plan and requirements along with care coordination.         Heide Chowdary PA-C  2/5/2025

## 2025-02-07 ENCOUNTER — PATIENT ROUNDING (BHMG ONLY) (OUTPATIENT)
Dept: BARIATRICS/WEIGHT MGMT | Facility: CLINIC | Age: 40
End: 2025-02-07
Payer: COMMERCIAL

## 2025-02-07 NOTE — PROGRESS NOTES
Good afternoon,    My name is Olya Arroyo, I am the Practice Manager for [facility] Bariatric Surgery.      I want to officially welcome you to our practice and ask about your recent visit.      If you could tell me about your recent visit with us. What things went well?      We're always looking for ways to make our patients experiences even better. Do you have recommendations on ways we may improve?      I appreciate you taking the time to answer a few questions today.      Thank you, and have a great day!        Message was sent to the patient via [employer] for PATIENT ROUNDING for [facility].

## 2025-02-10 DIAGNOSIS — R94.31 ABNORMAL EKG: Primary | ICD-10-CM

## 2025-02-11 LAB — VIT B1 BLD-SCNC: 168.6 NMOL/L (ref 66.5–200)

## 2025-02-12 ENCOUNTER — TELEPHONE (OUTPATIENT)
Dept: SLEEP MEDICINE | Facility: HOSPITAL | Age: 40
End: 2025-02-12
Payer: COMMERCIAL

## 2025-02-12 NOTE — TELEPHONE ENCOUNTER
LV requesting pt call sleep center to schedule repeat HST . Failed test , only 30 min of data on device

## 2025-02-17 ENCOUNTER — OFFICE VISIT (OUTPATIENT)
Dept: INTERNAL MEDICINE | Facility: CLINIC | Age: 40
End: 2025-02-17
Payer: COMMERCIAL

## 2025-02-17 ENCOUNTER — OFFICE VISIT (OUTPATIENT)
Dept: CARDIOLOGY | Facility: CLINIC | Age: 40
End: 2025-02-17
Payer: COMMERCIAL

## 2025-02-17 VITALS
BODY MASS INDEX: 44.41 KG/M2 | HEIGHT: 68 IN | HEART RATE: 72 BPM | OXYGEN SATURATION: 98 % | WEIGHT: 293 LBS | SYSTOLIC BLOOD PRESSURE: 112 MMHG | DIASTOLIC BLOOD PRESSURE: 86 MMHG

## 2025-02-17 VITALS
HEART RATE: 88 BPM | SYSTOLIC BLOOD PRESSURE: 124 MMHG | WEIGHT: 293 LBS | BODY MASS INDEX: 44.41 KG/M2 | HEIGHT: 68 IN | DIASTOLIC BLOOD PRESSURE: 84 MMHG

## 2025-02-17 DIAGNOSIS — F90.9 ATTENTION DEFICIT HYPERACTIVITY DISORDER (ADHD), UNSPECIFIED ADHD TYPE: ICD-10-CM

## 2025-02-17 DIAGNOSIS — L40.9 PSORIASIS: ICD-10-CM

## 2025-02-17 DIAGNOSIS — E66.01 CLASS 3 SEVERE OBESITY DUE TO EXCESS CALORIES WITH BODY MASS INDEX (BMI) OF 45.0 TO 49.9 IN ADULT, UNSPECIFIED WHETHER SERIOUS COMORBIDITY PRESENT: Primary | ICD-10-CM

## 2025-02-17 DIAGNOSIS — Z01.818 PRE-OP EVALUATION: ICD-10-CM

## 2025-02-17 DIAGNOSIS — O14.93 PRE-ECLAMPSIA IN THIRD TRIMESTER: ICD-10-CM

## 2025-02-17 DIAGNOSIS — E66.813 CLASS 3 SEVERE OBESITY DUE TO EXCESS CALORIES WITH BODY MASS INDEX (BMI) OF 45.0 TO 49.9 IN ADULT, UNSPECIFIED WHETHER SERIOUS COMORBIDITY PRESENT: Primary | ICD-10-CM

## 2025-02-17 DIAGNOSIS — N93.9 ABNORMAL UTERINE BLEEDING: ICD-10-CM

## 2025-02-17 DIAGNOSIS — E66.01 OBESITY, CLASS III, BMI 40-49.9 (MORBID OBESITY): Primary | ICD-10-CM

## 2025-02-17 PROCEDURE — 99213 OFFICE O/P EST LOW 20 MIN: CPT | Performed by: INTERNAL MEDICINE

## 2025-02-17 PROCEDURE — 93000 ELECTROCARDIOGRAM COMPLETE: CPT | Performed by: INTERNAL MEDICINE

## 2025-02-17 PROCEDURE — 99204 OFFICE O/P NEW MOD 45 MIN: CPT | Performed by: INTERNAL MEDICINE

## 2025-02-17 NOTE — PROGRESS NOTES
CARDIOLOGY    Mayco Valera MD    ENCOUNTER DATE:  25      Alice Adams / 39 y.o. / female        CHIEF COMPLAINT / REASON FOR OFFICE VISIT     Abnormal ECG (PRE-OP CLEARANCE )      HISTORY OF PRESENT ILLNESS       HPI    Alice Adams is a 39 y.o. female with morbid obesity, ADHD, history of preeclampsia, psoriasis who presents to [facility] care as a new patient.    Patient underwent hysteroscopy last year for lysis of abnormal uterine adhesions in the setting of uterine bleeding.  She is undergoing evaluation for bariatric surgery by Dr. Hernandez. Today, patient has no acute complaints. 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 Does not exercise regularly. She was able to climb 5 flights of stairs with some shortness of breath which did not bother her too much. Occasional  leg edema after eating a salty dinner. Denies chest pain, palpitation, orthopnea, PND, dizziness, syncope, bleeding, or unexpected falls. Does snore a fair bit per  but a sleep study was reportedly reassuring. Does not recall having had a heart attack or stroke.  She did have 2 miscarriages with both known pregnancies complicated by preeclampsia, the most recent one being in 2020.  Family history is notable only for maternal grandmother who had unknown heart disease but lived until age 88 and  from Alzheimer dementia. Never smoker, denies alcohol or substance abuse.    REVIEW OF SYSTEMS     Review of Systems   Constitutional: Negative for weight loss.   Cardiovascular:  Positive for dyspnea on exertion. Negative for chest pain, leg swelling, orthopnea, palpitations, paroxysmal nocturnal dyspnea and syncope.   Respiratory:  Negative for shortness of breath.    Hematologic/Lymphatic: Negative for bleeding problem.   Musculoskeletal:  Negative for falls.   Gastrointestinal:  Negative for hematochezia and melena.   Genitourinary:  Negative for hematuria.   Neurological:  Negative for dizziness and light-headedness.  "  Psychiatric/Behavioral:  Negative for altered mental status.            MEDICATIONS      Outpatient Encounter Medications as of 2/17/2025   Medication Sig Dispense Refill   • prenatal vitamin (prenatal, CLASSIC, vitamin) tablet Take  by mouth Daily.       No facility-administered encounter medications on file as of 2/17/2025.         VITAL SIGNS     Visit Vitals  /84   Pulse 88   Ht 172 cm (67.72\")   Wt (!) 143 kg (314 lb 3.2 oz)   BMI 48.17 kg/m²         Wt Readings from Last 3 Encounters:   02/17/25 (!) 143 kg (314 lb 3.2 oz)   02/05/25 (!) 144 kg (317 lb)   08/04/24 136 kg (300 lb)     Body mass index is 48.17 kg/m².      PHYSICAL EXAMINATION     Vitals and nursing note reviewed.   Constitutional:       Appearance: Not in distress.      Comments: Morbidly obese   Neck:      Comments: JVD difficult to assess due to body habitus  Pulmonary:      Effort: Pulmonary effort is normal.   Cardiovascular:      Normal rate. Regular rhythm.      Murmurs: There is no murmur.   Edema:     Peripheral edema absent.   Abdominal:      General: There is distension.      Palpations: Abdomen is soft.      Tenderness: There is no abdominal tenderness.   Skin:     General: Skin is warm and cool.   Neurological:      Mental Status: Alert and oriented to person, place and time.         REVIEWED DATA       ECG 12 Lead    Date/Time: 2/17/2025 8:49 AM  Performed by: Mayco Valera MD    Authorized by: Mayco Valera MD  Comparison: compared with previous ECG from 2/5/2025  Similar to previous ECG  Rhythm: sinus rhythm  Other findings: left ventricular hypertrophy    Clinical impression: non-specific ECG        Lab Results   Component Value Date    WBC 7.71 04/25/2024    HGB 12.9 04/25/2024    HCT 40.1 04/25/2024    MCV 84.8 04/25/2024     04/25/2024       Lab Results   Component Value Date    HGBA1C 5.50 04/25/2024       Lab Results   Component Value Date    GLUCOSE 90 04/25/2024    BUN 11 04/25/2024    CREATININE 0.74 " "04/25/2024    EGFR 107.7 04/25/2024    BCR 14.9 04/25/2024    K 4.5 04/25/2024    CO2 25.1 04/25/2024    CALCIUM 9.1 04/25/2024    ALBUMIN 3.9 04/25/2024    BILITOT 0.3 04/25/2024    AST 16 04/25/2024    ALT 18 04/25/2024       Lab Results   Component Value Date    CHLPL 164 04/25/2024    TRIG 139 04/25/2024    HDL 47 04/25/2024    LDL 93 04/25/2024             ASSESSMENT & PLAN     Diagnoses and all orders for this visit:    1. Obesity, Class III, BMI 40-49.9 (morbid obesity) (Primary)  Overview:  Formatting of this note is different from the original.      Today's Measurements:   Wt Readings from Last 1 Encounters:    06/07/22 128.5 kg (283 lb 3.2 oz)       Ht Readings from Last 1 Encounters:    09/28/21 1.727 m (5' 8\")       Estimated body mass index is 43.06 kg/m² as calculated from the following:     Height as of 9/28/21: 1.727 m (5' 8\").     Weight as of this encounter: 128.5 kg (283 lb 3.2 oz).   Waist Circumference:   BP Readings from Last 1 Encounters:    06/07/22 126/72       Pulse Readings from Last 1 Encounters:    06/07/22 84       Weight history:   Past successful attempts to lose weight: weight watchers, walking/running   Past bariatric medications/surgeries: Once was given an \"appetite suppressant\", not sure what it was. Caused her heart to race.   Medications associated with obesity: none   History of eating disorders: Has had periods of restriction for months at a time.      Add?   Crossfit? 2-3 days per week   Not losing despite staying on it:   Surgery:   Genetics:   Meds/metabolism:      Classification:   WHO staging: class III obesity (BMI 40+)   AACE staging: Stage 1 (Patients with a BMI å25 kg/m2 , with one or more mild to moderate complications, or who are being treated effectively with moderate weight loss)   [place] Obesity Staging System: Stage 1 (Patient has obesity-related SUBCLINICAL risk factors (borderline hypertension, impaired fasting glucose, elevated liver enzymes, etc.) - OR- " "MILD physical symptoms - patient currently not requiring medical treatment for comorbidities (dyspnea on moderate exertion, occasional aches/pains, fatigue, etc.) - OR - MILD obesity-related psychological symptoms and/or mild impairment of well-being      Obesity Complications:   BLANCA Score: pending   osteoarthritis, depression      Lifestyle:    Current Nutrition: diabetic diet   Current Physical Activity: cross fit   Contraception: paragard   Barriers to change: busy lifestyle       Medical History      No: Seizure disorder   Yes: Depression/ Anxiety   No: Arrhythmia   No: Coronary Artery Disease   No: Substance Abuse   No: Glaucoma    No: Hypertension    No: Kidney Stone   No: Thyroid Cancer   No: Gallstones   No: Malabsorption Disorder       6/9/2022:   Weight has always been a hard topic for her. Even at an age of 7-8 she remembers feeling like she was bigger than everyone else. Feels that she is a serial dieter. Was put on weight watchers at the age of 12-13. Lost about 70 pounds while in high school. Maintained that weight loss in college. Gained 40lb in grad school. Lost that weight through frequent walking and running.   Right now working on \"diabetic\" diet because her father who is living with her is diabetic. She has lost one pound in the past 3-4 weeks and hasn't \"cheated\" at all.   She is doing crossfit right now. Was feeling like she was making a lot of progress but feels defeated because she is so much less athletic now than she used to be.   After her first child she did lose some weight but gained more with the second child.    Does have a history of pre-eclampsia.       Plan   - Reviewed weight history and indications for weight management    - Reviewed possible medical treatments, handouts provided    - Fasting labs ordered   - Reviewed [facility] weight management patient expectations     - Medications: consider metformin pending blood work. Likely start ozempic at follow up.   - Contraception: " Paragard   - Labs at baseline and every 3 months while on weight loss medications.   - Reviewed nutritional and physical activity goals, as below      Goals:    Her short term goals for her next visit are as follows:    - Nutrition: Organize your eating to two meals and two snacks per day. Don't be afraid of special occasions. Add in protein shakes as needed. Eat out only twice per week.     - Fluid intake: Start drinking more water. Aim for 64 ounces per day. Aim for at least 4000 steps per day.    - Activity: Continue cross fit 2-3 times per week    - Me time/mental   health: Consider counseling      - Follow up in 2-3 weeks      6/14/2022: Start metformin 500mg-->1000mg daily      6/22/2022: Started metformin.Now up to 1000mg daily. Has had a few episodes of nausea but they have subsided.   Really has been thinking about the mental and emotional aspect of her eating habits and weight history. Interested in initiating therapy, needs to call to get scheduled.    Has also been taking vitamin D.    Averaging 6000 steps per day.    Wt Readings from Last 3 Encounters:    06/22/22 125.6 kg (277 lb)    06/07/22 128.5 kg (283 lb 3.2 oz)    09/28/21 117.7 kg (259 lb 6.4 oz)          Plan   - Increase metformin as tolerated. Consider ozempic pending response.   - Contraception: Paragard   - Labs at baseline and every 3 months while on weight loss medications.   - Reviewed nutritional and physical activity goals, as below      Goals:    Her short term goals for her next visit are as follows:    - Nutrition: Organize your eating to two meals and two snacks per day. Don't be afraid of special occasions. Add in protein shakes as needed. Eat out only twice per week.     - Fluid intake: Start drinking more water. Aim for 64 ounces per day. Aim for at least 4000 steps per day.    - Activity: Continue cross fit 2-3 times per week    - Me time/mental health: Schedule an appointment for counseling      8/5/2022: Going back and forth  between [place] and [place] right now. Had to cancel her gym membership in [place] and hasn't been able to get a new one in [place]. Was taking metformin regularly until recently when she lost it for about a week but found it again. Her current dose is now 1000mg BID. She does feel better taking it, feels like she processes what she eats better. She is not currently experiencing any SE. Got on the scale this morning and weight was 280 which is a few pounds higher than where she was at the last visit.      Plan:   - Start GLP1. Will try wegovy.   - Continue metformin.   - Contraception: Paragard   - Labs at baseline and every 3 months while on weight loss medications.   - Reviewed nutritional and physical activity goals, as below      Goals:    Her short term goals for her next visit are as follows:    - Nutrition: Meet with nutritionist.    - Fluid intake: Start drinking more water. Aim for 64 ounces per day. Aim for at least 4000 steps per day.    - Activity: Continue cross fit 2-3 times per week    - Me time/mental health: Schedule an appointment for counseling      11/9/2022: Was never able to start wegovy. Was covered by insurance but is on backorder. She is taking metformin. Got up to 2000mg daily but had GI SE so went back down to 1000mg total daily. Now on 1000mg daily. She will be moving to [facility] soon and is looking for a new provider. Has been working out and focusing on protein. Intermittent fasting has been difficult. Today's weight is 278. Has been doing anson yoga twice per week, weights with her  once per week, walking at lunch. Has been setting boundaries at work which has been really useful mentally.      Plan:   - Start GLP1. Will try wegovy at alternative pharmacy.   - Continue metformin 1000mg daily.   - Contraception: Paragard   - Reviewed nutritional and physical activity goals, as below   - Can return to [facility] if/when she moves back to [place]      Goals:    Her short term goals for her  next visit are as follows:    - Nutrition: Continue focusing on protein.    - Fluid intake: Aim for 64 ounces per day. Aim for at least 4000 steps per day.    - Activity: Continue cross fit 2-3 times per week    - Me time/mental health: Continue boundary setting at work.      2. Abnormal uterine bleeding    3. Pre-eclampsia in third trimester  Overview:  Formatting of this note might be different from the original.   Pre-E w/o SF, diagnosed on admission by elevated Bps and symptoms. Serum labs and UPC WNL.      4. Pre-op evaluation    5. Attention deficit hyperactivity disorder (ADHD), unspecified ADHD type  Overview:  Formatting of this note might be different from the original.   6/7/2022: Diagnosed in childhood. Elementary school: [employer]   Middle school through college/grad school: [employer].    Hasn't taken anything since getting masters because she wanted to stop taking medication.      6. Psoriasis       Preoperative evaluation: Screening ECG this month 2/2025 showed NSR with possible LVH.  Patient otherwise reasonably active for age and weight and able to perform 4-10 METs without angina.  RCRI score 1, Mcqueen score predicts 0.0% risk of MI or cardiac arrest intraoperatively or up to 30 days postop.  No echo on file, will order to rule out significant structural abnormalities.  If this is reassuring, no further cardiac testing needed prior to bariatric surgery  History of preeclampsia: Pt had 2 miscarriages with both pregnancies complicated by preeclempsia. Management per primary team and other specialists, she is at increased risk for long-term cardiovascular disease.  ADHD: Management per primary team and other specialists.  Psoriasis: Prior history, no recent flare-ups.   Morbid obesity: BMI 49. Discussed lifestyle modification including dietary changes to assist with weight loss. She tried Wegovy up to 1.5mg per week and lost 20lb, but pt had to stop due to severe diarrhea and supply shortage.  Considering  bariatric surgery as above.    I spent 36 minutes caring for Alice on this date of service. This time includes time spent by me in the following activities: preparing for the visit, reviewing tests, performing a medically appropriate examination and/or evaluation, counseling and educating the patient/family/caregiver, referring and communicating with other health care professionals, and documenting information in the medical record.     Mayco Valera MD. PhD. [facility]  02/17/25  08:26 EST    Part of this note may be an electronic transcription/translation of spoken language to printed text using the Dragon dictation system.

## 2025-02-17 NOTE — PROGRESS NOTES
Chief Complaint  Follow-up (Surgery form needs to fill out)    Subjective        Alice Adams presents to [facility]  History of Present Illness  Alice Adams is a 38 y/o F with PMH of obesity, ADHD, and h/o depression who presents for evaluation for bariatric surgery. Patient reports that she has been struggling with her weight for many years. States that she though about bariatric surgery several years ago but wanted to try to have another child before undergoing the procedure. States that she was following with nutritionist in 2020 and had minimal weight changes with dietary modifications. Patient notes that she did try Wegovy in the past to help with her weight loss and states that she lost 20 lbs but was having significant side effects of diarrhea. She reports that her  had undergone bariatric surgery and after noting significant improvement in his weight, she was encouraged to undergo surgery as well. Patient was evaluated by a dietician and bariatric surgery group on 2/5/25 and presents today to obtain a letter of medical support from her PCP. Patient reports that additional workup that she has undergone include a home sleep study which she reports was unremarkable, and cardiac clearance for suspected LVH and she states that she has a TTE pending for next week.      Review of Systems   Constitutional:  Negative for chills and fever.   HENT:  Negative for ear pain, hearing loss, rhinorrhea and sore throat.    Eyes:  Negative for pain and visual disturbance.   Respiratory:  Negative for cough, shortness of breath and wheezing.    Cardiovascular:  Negative for chest pain, palpitations and leg swelling.   Gastrointestinal:  Negative for abdominal pain, blood in stool, diarrhea, nausea and vomiting.   Genitourinary:  Negative for dysuria, hematuria and menstrual problem.   Neurological:  Negative for dizziness, light-headedness and headaches.  "  Psychiatric/Behavioral:  Negative for dysphoric mood.    All other systems reviewed and are negative.        Objective   Vital Signs:  /86 (BP Location: Right arm, Patient Position: Sitting, Cuff Size: Adult)   Pulse 72   Ht 172 cm (67.72\")   Wt (!) 143 kg (315 lb)   SpO2 98%   BMI 48.29 kg/m²   Estimated body mass index is 48.29 kg/m² as calculated from the following:    Height as of this encounter: 172 cm (67.72\").    Weight as of this encounter: 143 kg (315 lb).            Physical Exam  Constitutional:       General: She is not in acute distress.     Appearance: Normal appearance. She is obese. She is not ill-appearing.   HENT:      Head: Normocephalic and atraumatic.      Right Ear: External ear normal.      Left Ear: External ear normal.      Nose: Nose normal.      Mouth/Throat:      Mouth: Mucous membranes are moist.      Pharynx: Oropharynx is clear.   Eyes:      General: No scleral icterus.     Extraocular Movements: Extraocular movements intact.      Conjunctiva/sclera: Conjunctivae normal.      Pupils: Pupils are equal, round, and reactive to light.   Cardiovascular:      Rate and Rhythm: Normal rate and regular rhythm.      Pulses: Normal pulses.      Heart sounds: Normal heart sounds. No murmur heard.  Pulmonary:      Effort: Pulmonary effort is normal. No respiratory distress.      Breath sounds: Normal breath sounds. No wheezing.   Abdominal:      General: Bowel sounds are normal. There is no distension.      Palpations: Abdomen is soft.      Tenderness: There is no abdominal tenderness. There is guarding.   Musculoskeletal:         General: Normal range of motion.      Right lower leg: No edema.      Left lower leg: No edema.   Skin:     General: Skin is warm and dry.   Neurological:      General: No focal deficit present.      Mental Status: She is alert and oriented to person, place, and time. Mental status is at baseline.   Psychiatric:         Mood and Affect: Mood normal.         " Behavior: Behavior normal.         Thought Content: Thought content normal.         Judgment: Judgment normal.        Result Review :  The following data was reviewed by: Toño Vaughan MD on 02/17/2025:  Common labs          4/17/2024    17:32 4/19/2024    14:40 4/25/2024    08:51   Common Labs   Glucose 97  82  90    BUN 15  11  11    Creatinine 1.36  0.66  0.74  C   Sodium 137  140  139    Potassium 3.8  4.4  4.5    Chloride 99  105  104    Calcium 9.4  9.1  9.1    Albumin 4.2   3.9    Total Bilirubin 0.3   0.3    Alkaline Phosphatase 134   105    AST (SGOT) 16   16    ALT (SGPT) 19   18    WBC 11.26   7.71    Hemoglobin 14.1   12.9    Hematocrit 43.0   40.1    Platelets 351   277    Total Cholesterol   164    Triglycerides   139    HDL Cholesterol   47    LDL Cholesterol    93    Hemoglobin A1C   5.50       Details         C Corrected result             Data reviewed : prior office notes reviewed           Assessment and Plan   Diagnoses and all orders for this visit:    1. Class 3 severe obesity due to excess calories with body mass index (BMI) of 45.0 to 49.9 in adult, unspecified whether serious comorbidity present (Primary)        Alice Adams is a 40 y/o F with PMH of obesity, ADHD, and h/o depression who presents for evaluation for bariatric surgery.    #Class III Obesity  #Pre-operative evaluation for bariatric surgery  - Patient reports having tried multiple diets and medications for weight loss with minimal success  - Reports after her  underwent bariatric surgery with great success, patient was encouraged to be evaluated as well  - Evaluated by bariatric surgery group on 2/5/25 with plans for sleeve gastrectomy  - Patient reports having undergone home sleep study with no concerns for JESSICA  - During pre-operative evaluation, EKG found concern for LVH so patient underwent pre-operative cardiac risk assessment  Plan:  - Final cardiac risk assessment per cardiology pending TTE  - Bariatric pre-operative  evaluation per surgery: EGD and close follow up with dietician       I spent 15 minutes caring for Aliec on this date of service. This time includes time spent by me in the following activities:preparing for the visit, reviewing tests, obtaining and/or reviewing a separately obtained history, performing a medically appropriate examination and/or evaluation , counseling and educating the patient/family/caregiver, ordering medications, tests, or procedures, and documenting information in the medical record  Follow Up   No follow-ups on file.  Patient was given instructions and counseling regarding her condition or for health maintenance advice. Please see specific information pulled into the AVS if appropriate.     Patient seen and examined personally by me following resident evaluation. Agree with assessment and plan as above unless documented below.    Toño Vaughan MD   [facility] Pediatric[facility]

## 2025-02-18 ENCOUNTER — HOSPITAL ENCOUNTER (OUTPATIENT)
Facility: HOSPITAL | Age: 40
Setting detail: HOSPITAL OUTPATIENT SURGERY
Discharge: HOME | End: 2025-02-18
Attending: SURGERY | Admitting: SURGERY
Payer: COMMERCIAL

## 2025-02-18 ENCOUNTER — ANESTHESIA (OUTPATIENT)
Dept: GASTROENTEROLOGY | Facility: HOSPITAL | Age: 40
End: 2025-02-18
Payer: COMMERCIAL

## 2025-02-18 ENCOUNTER — ANESTHESIA EVENT (OUTPATIENT)
Dept: GASTROENTEROLOGY | Facility: HOSPITAL | Age: 40
End: 2025-02-18
Payer: COMMERCIAL

## 2025-02-18 VITALS
HEART RATE: 72 BPM | BODY MASS INDEX: 44.41 KG/M2 | DIASTOLIC BLOOD PRESSURE: 78 MMHG | WEIGHT: 293 LBS | HEIGHT: 68 IN | RESPIRATION RATE: 16 BRPM | SYSTOLIC BLOOD PRESSURE: 122 MMHG | OXYGEN SATURATION: 98 %

## 2025-02-18 DIAGNOSIS — Z01.818 PRE-OP EVALUATION: ICD-10-CM

## 2025-02-18 LAB
B-HCG UR QL: NEGATIVE
EXPIRATION DATE: NORMAL
INTERNAL NEGATIVE CONTROL: NEGATIVE
INTERNAL POSITIVE CONTROL: POSITIVE
Lab: NORMAL

## 2025-02-18 PROCEDURE — 25010000002 LIDOCAINE 2% SOLUTION: Performed by: NURSE ANESTHETIST, CERTIFIED REGISTERED

## 2025-02-18 PROCEDURE — 25010000002 PROPOFOL 200 MG/20ML EMULSION: Performed by: NURSE ANESTHETIST, CERTIFIED REGISTERED

## 2025-02-18 PROCEDURE — 87081 CULTURE SCREEN ONLY: CPT | Performed by: SURGERY

## 2025-02-18 PROCEDURE — 25810000003 LACTATED RINGERS PER 1000 ML: Performed by: SURGERY

## 2025-02-18 PROCEDURE — 81025 URINE PREGNANCY TEST: CPT | Performed by: SURGERY

## 2025-02-18 RX ORDER — SODIUM CHLORIDE, SODIUM LACTATE, POTASSIUM CHLORIDE, CALCIUM CHLORIDE 600; 310; 30; 20 MG/100ML; MG/100ML; MG/100ML; MG/100ML
30 INJECTION, SOLUTION INTRAVENOUS CONTINUOUS PRN
Status: DISCONTINUED | OUTPATIENT
Start: 2025-02-18 | End: 2025-02-18 | Stop reason: HOSPADM

## 2025-02-18 RX ORDER — LIDOCAINE HYDROCHLORIDE 20 MG/ML
INJECTION, SOLUTION INFILTRATION; PERINEURAL AS NEEDED
Status: DISCONTINUED | OUTPATIENT
Start: 2025-02-18 | End: 2025-02-18 | Stop reason: SURG

## 2025-02-18 RX ORDER — SODIUM CHLORIDE 0.9 % (FLUSH) 0.9 %
10 SYRINGE (ML) INJECTION AS NEEDED
Status: DISCONTINUED | OUTPATIENT
Start: 2025-02-18 | End: 2025-02-18 | Stop reason: HOSPADM

## 2025-02-18 RX ORDER — SODIUM CHLORIDE 0.9 % (FLUSH) 0.9 %
10 SYRINGE (ML) INJECTION EVERY 12 HOURS SCHEDULED
Status: DISCONTINUED | OUTPATIENT
Start: 2025-02-18 | End: 2025-02-18 | Stop reason: HOSPADM

## 2025-02-18 RX ORDER — SODIUM CHLORIDE 9 MG/ML
40 INJECTION, SOLUTION INTRAVENOUS AS NEEDED
Status: DISCONTINUED | OUTPATIENT
Start: 2025-02-18 | End: 2025-02-18 | Stop reason: HOSPADM

## 2025-02-18 RX ORDER — PROPOFOL 10 MG/ML
INJECTION, EMULSION INTRAVENOUS CONTINUOUS PRN
Status: DISCONTINUED | OUTPATIENT
Start: 2025-02-18 | End: 2025-02-18 | Stop reason: SURG

## 2025-02-18 RX ADMIN — PROPOFOL 250 MCG/KG/MIN: 10 INJECTION, EMULSION INTRAVENOUS at 08:14

## 2025-02-18 RX ADMIN — LIDOCAINE HYDROCHLORIDE 80 MG: 20 INJECTION, SOLUTION INFILTRATION; PERINEURAL at 08:14

## 2025-02-18 RX ADMIN — SODIUM CHLORIDE, POTASSIUM CHLORIDE, SODIUM LACTATE AND CALCIUM CHLORIDE 30 ML/HR: 600; 310; 30; 20 INJECTION, SOLUTION INTRAVENOUS at 07:49

## 2025-02-18 NOTE — ANESTHESIA PREPROCEDURE EVALUATION
Anesthesia Evaluation                  Airway   Mallampati: II  Possible difficult intubation  Dental      Pulmonary    Cardiovascular     ECG reviewed  Rhythm: regular  Rate: normal    (+) hypertension      Neuro/Psych  (+) headaches, psychiatric history Anxiety and ADHD  GI/Hepatic/Renal/Endo    (+) morbid obesity    Musculoskeletal     Abdominal    Substance History      OB/GYN          Other                    Anesthesia Plan    ASA 3     MAC     intravenous induction     Anesthetic plan, risks, benefits, and alternatives have been provided, discussed and informed consent has been obtained with: patient.    CODE STATUS:

## 2025-02-18 NOTE — ANESTHESIA POSTPROCEDURE EVALUATION
Patient: Alice Adams    Procedure Summary       Date: 02/18/25 Room / Location: [facility] ENDOSCOPY 1 / [facility] ENDOSCOPY    Anesthesia Start: 0810 Anesthesia Stop: 0821    Procedure: ESOPHAGOGASTRODUODENOSCOPY WITH BIOPSY (Esophagus) Diagnosis:       Pre-op evaluation      (Pre-op evaluation [Z01.818])    Surgeons: Nathaniel Hernandez Jr., MD Provider: Tariq Choe MD    Anesthesia Type: MAC ASA Status: 3            Anesthesia Type: MAC    Vitals  Vitals Value Taken Time   /78 02/18/25 0841   Temp     Pulse 75 02/18/25 0844   Resp 16 02/18/25 0840   SpO2 99 % 02/18/25 0844   Vitals shown include unfiled device data.        Post Anesthesia Care and Evaluation    Patient location during evaluation: PACU  Patient participation: complete - patient participated  Level of consciousness: awake and alert  Pain management: adequate    Airway patency: patent  Anesthetic complications: No anesthetic complications    Cardiovascular status: acceptable  Respiratory status: acceptable  Hydration status: acceptable    Comments: --------------------            02/18/25               0840     --------------------   BP:       122/78     Pulse:      72       Resp:       16       SpO2:      98%      --------------------

## 2025-02-18 NOTE — DISCHARGE INSTRUCTIONS
For the next 24 hours patient needs to be with a responsible adult.    For 24 hours DO NOT drive, operate machinery, appliances, drink alcohol, make important decisions or sign legal documents.    Start with a light or bland diet and advance to regular diet as tolerated.    Follow recommendations on procedure report provided by your doctor.    Call Dr Hernandez for problems [phone]    Problems may include but not limited to: large amounts of bleeding, trouble breathing, repeated vomiting, severe unrelieved pain, fever or chills.

## 2025-02-18 NOTE — OP NOTE
Surgeon: Nathaniel Hernandez Jr., M.D.    Preoperative Diagnosis: Dyspepsia    Postoperative Diagnosis: Gastritis    Procedure Performed: Transoral esophagogastroduodenoscopy with gastric antral biopsies    Indications: 39-year-old female who presents for preoperative evaluation.  Patient does not take H2 blocker or PPI on regular basis    Procedure:     The procedure, indications, preparation and potential complications were explained to the patient, who indicated understanding and signed the corresponding consent forms.  The patient was identified, taken to the endoscopy suite, and placed on the left side down decubitus position.  The patient underwent a MAC anesthesia and was appropriately monitored through the case by the anesthesia personnel with continuous pulse oximetry, blood pressure, and cardiac monitoring.  A bite block was placed.  After adequate IV sedation and using a transoral technique a lubed flexible endoscope was placed in the hypopharynx and advanced to the second portion of the duodenum without difficulty. The scope was then withdrawn back into the antrum of the stomach.  Cold forcep biopsies of the antrum were taken to rule out Helicobacter pylori.  The scope was retroflexed noting the body, fundus and cardia.  The scope was then withdrawn back into the esophagus after decompressing the stomach.  The Z line was noted and GE junction measured from the incisors.  The scope was then completely withdrawn.  The patient tolerated the procedure well and left the endoscopy suite in stable condition.  The findings are listed below.    Duodenum: Unremarkable  Antrum: Minimal patchy erythema  Body/Fundus: Unremarkable  Cardia: Unremarkable  Esophagus: Z-line regular, no erosive esophagitis    Recommendations:     Wait biopsy results and follow-up in the office

## 2025-02-20 ENCOUNTER — PATIENT ROUNDING (BHMG ONLY) (OUTPATIENT)
Dept: CARDIOLOGY | Facility: CLINIC | Age: 40
End: 2025-02-20
Payer: COMMERCIAL

## 2025-02-20 LAB — UREASE TISS QL: NEGATIVE

## 2025-02-20 NOTE — PROGRESS NOTES
A My Chart message has been sent to the patient for PATIENT ROUNDING with [facility]

## 2025-02-25 ENCOUNTER — HOSPITAL ENCOUNTER (OUTPATIENT)
Dept: CARDIOLOGY | Facility: HOSPITAL | Age: 40
Discharge: HOME | End: 2025-02-25
Admitting: INTERNAL MEDICINE
Payer: COMMERCIAL

## 2025-02-25 VITALS
HEIGHT: 68 IN | SYSTOLIC BLOOD PRESSURE: 120 MMHG | BODY MASS INDEX: 44.41 KG/M2 | WEIGHT: 293 LBS | DIASTOLIC BLOOD PRESSURE: 84 MMHG

## 2025-02-25 DIAGNOSIS — O14.93 PRE-ECLAMPSIA IN THIRD TRIMESTER: ICD-10-CM

## 2025-02-25 DIAGNOSIS — F90.9 ATTENTION DEFICIT HYPERACTIVITY DISORDER (ADHD), UNSPECIFIED ADHD TYPE: ICD-10-CM

## 2025-02-25 DIAGNOSIS — Z01.818 PRE-OP EVALUATION: ICD-10-CM

## 2025-02-25 DIAGNOSIS — E66.01 OBESITY, CLASS III, BMI 40-49.9 (MORBID OBESITY): ICD-10-CM

## 2025-02-25 DIAGNOSIS — N93.9 ABNORMAL UTERINE BLEEDING: ICD-10-CM

## 2025-02-25 LAB
AORTIC ARCH: 2.3 CM
ASCENDING AORTA: 2.9 CM
AV MEAN PRESS GRAD SYS DOP V1V2: 4 MMHG
AV VMAX SYS DOP: 133 CM/SEC
BH CV ECHO MEAS - ACS: 1.86 CM
BH CV ECHO MEAS - AO MAX PG: 7.1 MMHG
BH CV ECHO MEAS - AO ROOT DIAM: 3.1 CM
BH CV ECHO MEAS - AO V2 VTI: 28.1 CM
BH CV ECHO MEAS - AVA(I,D): 2.6 CM2
BH CV ECHO MEAS - EDV(CUBED): 117 ML
BH CV ECHO MEAS - EDV(MOD-SP2): 145 ML
BH CV ECHO MEAS - EDV(MOD-SP4): 149 ML
BH CV ECHO MEAS - EF(MOD-SP2): 67.6 %
BH CV ECHO MEAS - EF(MOD-SP4): 69.8 %
BH CV ECHO MEAS - ESV(CUBED): 32.4 ML
BH CV ECHO MEAS - ESV(MOD-SP2): 47 ML
BH CV ECHO MEAS - ESV(MOD-SP4): 45 ML
BH CV ECHO MEAS - FS: 34.9 %
BH CV ECHO MEAS - IVS/LVPW: 1.08 CM
BH CV ECHO MEAS - IVSD: 0.9 CM
BH CV ECHO MEAS - LAT PEAK E' VEL: 18.3 CM/SEC
BH CV ECHO MEAS - LV DIASTOLIC VOL/BSA (35-75): 60.1 CM2
BH CV ECHO MEAS - LV MASS(C)D: 144.2 GRAMS
BH CV ECHO MEAS - LV MAX PG: 5 MMHG
BH CV ECHO MEAS - LV MEAN PG: 3 MMHG
BH CV ECHO MEAS - LV SYSTOLIC VOL/BSA (12-30): 18.2 CM2
BH CV ECHO MEAS - LV V1 MAX: 112 CM/SEC
BH CV ECHO MEAS - LV V1 VTI: 22.7 CM
BH CV ECHO MEAS - LVIDD: 4.9 CM
BH CV ECHO MEAS - LVIDS: 3.2 CM
BH CV ECHO MEAS - LVOT AREA: 3.2 CM2
BH CV ECHO MEAS - LVOT DIAM: 2.01 CM
BH CV ECHO MEAS - LVPWD: 0.83 CM
BH CV ECHO MEAS - MED PEAK E' VEL: 10.6 CM/SEC
BH CV ECHO MEAS - MV A DUR: 0.12 SEC
BH CV ECHO MEAS - MV A MAX VEL: 47 CM/SEC
BH CV ECHO MEAS - MV DEC SLOPE: 579.4 CM/SEC2
BH CV ECHO MEAS - MV DEC TIME: 0.19 SEC
BH CV ECHO MEAS - MV E MAX VEL: 84.5 CM/SEC
BH CV ECHO MEAS - MV E/A: 1.8
BH CV ECHO MEAS - MV MAX PG: 3.6 MMHG
BH CV ECHO MEAS - MV MEAN PG: 1.14 MMHG
BH CV ECHO MEAS - MV P1/2T: 52.1 MSEC
BH CV ECHO MEAS - MV V2 VTI: 23.6 CM
BH CV ECHO MEAS - MVA(P1/2T): 4.2 CM2
BH CV ECHO MEAS - MVA(VTI): 3.1 CM2
BH CV ECHO MEAS - PA ACC TIME: 0.14 SEC
BH CV ECHO MEAS - PA V2 MAX: 128.6 CM/SEC
BH CV ECHO MEAS - PULM A REVS DUR: 0.11 SEC
BH CV ECHO MEAS - PULM A REVS VEL: 28 CM/SEC
BH CV ECHO MEAS - PULM DIAS VEL: 47.7 CM/SEC
BH CV ECHO MEAS - PULM S/D: 1.22
BH CV ECHO MEAS - PULM SYS VEL: 58 CM/SEC
BH CV ECHO MEAS - QP/QS: 0.82
BH CV ECHO MEAS - RAP SYSTOLE: 3 MMHG
BH CV ECHO MEAS - RV MAX PG: 2.49 MMHG
BH CV ECHO MEAS - RV V1 MAX: 79 CM/SEC
BH CV ECHO MEAS - RV V1 VTI: 19.4 CM
BH CV ECHO MEAS - RVOT DIAM: 1.97 CM
BH CV ECHO MEAS - SV(LVOT): 72.3 ML
BH CV ECHO MEAS - SV(MOD-SP2): 98 ML
BH CV ECHO MEAS - SV(MOD-SP4): 104 ML
BH CV ECHO MEAS - SV(RVOT): 59 ML
BH CV ECHO MEAS - SVI(LVOT): 29.2 ML/M2
BH CV ECHO MEAS - SVI(MOD-SP2): 39.5 ML/M2
BH CV ECHO MEAS - SVI(MOD-SP4): 41.9 ML/M2
BH CV ECHO MEAS - TAPSE (>1.6): 2.35 CM
BH CV ECHO MEASUREMENTS AVERAGE E/E' RATIO: 5.85
BH CV XLRA - RV BASE: 3.1 CM
BH CV XLRA - RV LENGTH: 9.5 CM
BH CV XLRA - RV MID: 2.45 CM
BH CV XLRA - TDI S': 11.2 CM/SEC
LEFT ATRIUM VOLUME INDEX: 15.3 ML/M2
LV EF BIPLANE MOD: 67.7 %
SINUS: 2.9 CM
STJ: 2.3 CM

## 2025-02-25 PROCEDURE — 93306 TTE W/DOPPLER COMPLETE: CPT | Performed by: INTERNAL MEDICINE

## 2025-02-25 PROCEDURE — 93306 TTE W/DOPPLER COMPLETE: CPT

## 2025-02-25 PROCEDURE — 25510000001 PERFLUTREN 6.52 MG/ML SUSPENSION 2 ML VIAL: Performed by: INTERNAL MEDICINE

## 2025-02-25 RX ADMIN — PERFLUTREN 2 ML: 6.52 INJECTION, SUSPENSION INTRAVENOUS at 09:09

## 2025-02-28 ENCOUNTER — TELEPHONE (OUTPATIENT)
Dept: CARDIOLOGY | Facility: CLINIC | Age: 40
End: 2025-02-28
Payer: COMMERCIAL

## 2025-02-28 ENCOUNTER — TELEPHONE (OUTPATIENT)
Dept: BARIATRICS/WEIGHT MGMT | Facility: CLINIC | Age: 40
End: 2025-02-28
Payer: COMMERCIAL

## 2025-02-28 NOTE — TELEPHONE ENCOUNTER
I have faxed a copy of clearance letter and conversion below to Dr Hernandez's office.   I received confirmation that fax went through to Dr David YIN

## 2025-02-28 NOTE — TELEPHONE ENCOUNTER
2/28/25   Pt called  for surgery clearance for Bariatric Surgery with Dr Nathaniel Hernandez.   Pt states Dr Valera gave her a verbal clearance over phone when giving echo Results. Dr Valera CC'ed Dr Vaughan and Dr Hernandez with results.See below.  Pt needs letter sent to Dr Hernandez for Clearance.   Please advise  Pt's callback # [phone]   Thanks Vipul Yun RN  2/26/2025  8:19 AM EST       Notified patient of results/recommendations. Patient verbalized understanding.     Bessie Yun RN  Triage [facility]    Mayco Valera MD  2/25/2025  8:17 PM EST       Echo reassuring, no further cardiac testing needed before proceeding to possible bariatric surgery.  CCing PCP Dr. Vaughan and bariatric surgeon Dr. Hernandez as update.

## 2025-02-28 NOTE — TELEPHONE ENCOUNTER
CARDIOLOGY    Patient Name: Alice Adams  :1985  Age: 39 y.o.    [birth year]    To whom it may concern:    Alice Adams was referred to my office for cardiovascular risk assessment exam for upcoming bariatric surgery.    From a cardiovascular standpoint, the patient is at the following risk of major cardiovascular event in the angela-operative setting:  [x] Low         [] Modifiable  [] Low-Moderate       [x] Non-Modifiable   [] Moderate  [] Moderare - high  [] High    Cardiac Testing:  [] Needs further cardiac testing  [x] Does not need further cardiac testing    Anticoagulant Status:  [x] No anticoagulants    [] The patient is on  [] ASA; hold for ___ days.  [] Coumadin; hold for ___ days.  [] Plavix; hold for ___ days.  [] Eliquis; hold for ___ days.  [] Brilinta; hold for ___ days.  [] Xarelto; hold for ___ days.  [] Effient; hold for ___ days.    [] The patient requires Lovenox bridging, which has been prescribed.     Beta Blockers:  [] The patient will need pre-op beta blockers which will be prescribed by my clinic.    If there are any problems during surgery, please do not hesitate to contact my office.    Thank you for allowing me to participate in this patient's care.    Sincerely,         OLIVER John  [facility]   [facility]  [address] [place] [address]  Office: [phone]

## 2025-03-10 ENCOUNTER — HOSPITAL ENCOUNTER (OUTPATIENT)
Dept: SLEEP MEDICINE | Facility: HOSPITAL | Age: 40
Discharge: HOME | End: 2025-03-10
Admitting: INTERNAL MEDICINE
Payer: COMMERCIAL

## 2025-03-10 PROCEDURE — G0399 HOME SLEEP TEST/TYPE 3 PORTA: HCPCS

## 2025-03-13 ENCOUNTER — RESULTS FOLLOW-UP (OUTPATIENT)
Dept: SLEEP MEDICINE | Facility: HOSPITAL | Age: 40
End: 2025-03-13
Payer: COMMERCIAL

## 2025-03-13 ENCOUNTER — TELEPHONE (OUTPATIENT)
Dept: SLEEP MEDICINE | Facility: HOSPITAL | Age: 40
End: 2025-03-13
Payer: COMMERCIAL

## 2025-03-13 DIAGNOSIS — G47.33 OSA (OBSTRUCTIVE SLEEP APNEA): Primary | ICD-10-CM

## 2025-03-13 DIAGNOSIS — R06.83 SNORING: ICD-10-CM

## 2025-03-13 NOTE — TELEPHONE ENCOUNTER
Pt called in and wanted to know if copy of sleep report was faxed to bariatric center. I looked at Dr Conde notes and it showed that it had been sent

## 2025-03-14 ENCOUNTER — TELEPHONE (OUTPATIENT)
Dept: SLEEP MEDICINE | Facility: HOSPITAL | Age: 40
End: 2025-03-14
Payer: COMMERCIAL

## 2025-03-18 ENCOUNTER — TELEPHONE (OUTPATIENT)
Dept: SLEEP MEDICINE | Facility: HOSPITAL | Age: 40
End: 2025-03-18
Payer: COMMERCIAL

## 2025-03-18 NOTE — TELEPHONE ENCOUNTER
Received a call from Fay that pt would like to wait until procedure is complete before trying cpap   Eye Protection Verbiage: Before proceeding with the stage, a plastic scleral shield was inserted. The globe was anesthetized with a few drops of 1% lidocaine with 1:100,000 epinephrine. Then, an appropriate sized scleral shield was chosen and coated with lacrilube ointment. The shield was gently inserted and left in place for the duration of each stage. After the stage was completed, the shield was gently removed.

## 2025-04-21 ENCOUNTER — PREP FOR SURGERY (OUTPATIENT)
Dept: OTHER | Facility: HOSPITAL | Age: 40
End: 2025-04-21
Payer: COMMERCIAL

## 2025-04-21 DIAGNOSIS — E66.01 OBESITY, CLASS III, BMI 40-49.9 (MORBID OBESITY): Primary | ICD-10-CM

## 2025-04-21 RX ORDER — SODIUM CHLORIDE 0.9 % (FLUSH) 0.9 %
3 SYRINGE (ML) INJECTION EVERY 12 HOURS SCHEDULED
OUTPATIENT
Start: 2025-04-21

## 2025-04-21 RX ORDER — METOCLOPRAMIDE HYDROCHLORIDE 5 MG/ML
10 INJECTION INTRAMUSCULAR; INTRAVENOUS ONCE
OUTPATIENT
Start: 2025-04-21 | End: 2025-04-21

## 2025-04-21 RX ORDER — GABAPENTIN 300 MG/1
600 CAPSULE ORAL ONCE
OUTPATIENT
Start: 2025-04-21 | End: 2025-04-21

## 2025-04-21 RX ORDER — SCOPOLAMINE 1 MG/3D
1 PATCH, EXTENDED RELEASE TRANSDERMAL CONTINUOUS
OUTPATIENT
Start: 2025-04-21 | End: 2025-04-24

## 2025-04-21 RX ORDER — SODIUM CHLORIDE, SODIUM LACTATE, POTASSIUM CHLORIDE, CALCIUM CHLORIDE 600; 310; 30; 20 MG/100ML; MG/100ML; MG/100ML; MG/100ML
100 INJECTION, SOLUTION INTRAVENOUS CONTINUOUS
OUTPATIENT
Start: 2025-04-21 | End: 2025-04-22

## 2025-04-21 RX ORDER — SODIUM CHLORIDE 9 MG/ML
40 INJECTION, SOLUTION INTRAVENOUS AS NEEDED
OUTPATIENT
Start: 2025-04-21

## 2025-04-21 RX ORDER — SODIUM CHLORIDE 0.9 % (FLUSH) 0.9 %
3-10 SYRINGE (ML) INJECTION AS NEEDED
OUTPATIENT
Start: 2025-04-21

## 2025-04-21 RX ORDER — CHLORHEXIDINE GLUCONATE ORAL RINSE 1.2 MG/ML
15 SOLUTION DENTAL SEE ADMIN INSTRUCTIONS
OUTPATIENT
Start: 2025-04-21

## 2025-04-21 RX ORDER — PANTOPRAZOLE SODIUM 40 MG/10ML
40 INJECTION, POWDER, LYOPHILIZED, FOR SOLUTION INTRAVENOUS ONCE
OUTPATIENT
Start: 2025-04-21 | End: 2025-04-21

## 2025-04-24 ENCOUNTER — LAB (OUTPATIENT)
Dept: LAB | Facility: HOSPITAL | Age: 40
End: 2025-04-24
Payer: COMMERCIAL

## 2025-04-24 ENCOUNTER — CONSULT (OUTPATIENT)
Dept: BARIATRICS/WEIGHT MGMT | Facility: CLINIC | Age: 40
End: 2025-04-24
Payer: COMMERCIAL

## 2025-04-24 ENCOUNTER — TELEPHONE (OUTPATIENT)
Dept: BARIATRICS/WEIGHT MGMT | Facility: CLINIC | Age: 40
End: 2025-04-24
Payer: COMMERCIAL

## 2025-04-24 VITALS
DIASTOLIC BLOOD PRESSURE: 92 MMHG | BODY MASS INDEX: 44.41 KG/M2 | TEMPERATURE: 97.7 F | HEART RATE: 72 BPM | HEIGHT: 68 IN | SYSTOLIC BLOOD PRESSURE: 114 MMHG | WEIGHT: 293 LBS

## 2025-04-24 DIAGNOSIS — E66.01 OBESITY, CLASS III, BMI 40-49.9 (MORBID OBESITY): ICD-10-CM

## 2025-04-24 DIAGNOSIS — Z71.3 DIETARY COUNSELING: ICD-10-CM

## 2025-04-24 DIAGNOSIS — R53.82 CHRONIC FATIGUE: ICD-10-CM

## 2025-04-24 DIAGNOSIS — E66.01 OBESITY, CLASS III, BMI 40-49.9 (MORBID OBESITY): Primary | ICD-10-CM

## 2025-04-24 PROBLEM — R31.9 URINARY TRACT INFECTION WITH HEMATURIA: Status: RESOLVED | Noted: 2024-08-04 | Resolved: 2025-04-24

## 2025-04-24 PROBLEM — N39.0 URINARY TRACT INFECTION WITH HEMATURIA: Status: RESOLVED | Noted: 2024-08-04 | Resolved: 2025-04-24

## 2025-04-24 LAB
ALBUMIN SERPL-MCNC: 4 G/DL (ref 3.5–5.2)
ALBUMIN/GLOB SERPL: 1.4 G/DL
ALP SERPL-CCNC: 125 U/L (ref 39–117)
ALT SERPL W P-5'-P-CCNC: 29 U/L (ref 1–33)
ANION GAP SERPL CALCULATED.3IONS-SCNC: 9.7 MMOL/L (ref 5–15)
AST SERPL-CCNC: 25 U/L (ref 1–32)
BILIRUB SERPL-MCNC: 0.2 MG/DL (ref 0–1.2)
BUN SERPL-MCNC: 11 MG/DL (ref 6–20)
BUN/CREAT SERPL: 14.3 (ref 7–25)
CALCIUM SPEC-SCNC: 9.2 MG/DL (ref 8.6–10.5)
CHLORIDE SERPL-SCNC: 104 MMOL/L (ref 98–107)
CO2 SERPL-SCNC: 24.3 MMOL/L (ref 22–29)
CREAT SERPL-MCNC: 0.77 MG/DL (ref 0.57–1)
DEPRECATED RDW RBC AUTO: 41.8 FL (ref 37–54)
EGFRCR SERPLBLD CKD-EPI 2021: 102 ML/MIN/1.73
ERYTHROCYTE [DISTWIDTH] IN BLOOD BY AUTOMATED COUNT: 13.8 % (ref 12.3–15.4)
GLOBULIN UR ELPH-MCNC: 2.9 GM/DL
GLUCOSE SERPL-MCNC: 71 MG/DL (ref 65–99)
HCT VFR BLD AUTO: 39.6 % (ref 34–46.6)
HGB BLD-MCNC: 13.3 G/DL (ref 12–15.9)
MCH RBC QN AUTO: 27.9 PG (ref 26.6–33)
MCHC RBC AUTO-ENTMCNC: 33.6 G/DL (ref 31.5–35.7)
MCV RBC AUTO: 83.2 FL (ref 79–97)
PLATELET # BLD AUTO: 317 10*3/MM3 (ref 140–450)
PMV BLD AUTO: 10.6 FL (ref 6–12)
POTASSIUM SERPL-SCNC: 4.4 MMOL/L (ref 3.5–5.2)
PROT SERPL-MCNC: 6.9 G/DL (ref 6–8.5)
RBC # BLD AUTO: 4.76 10*6/MM3 (ref 3.77–5.28)
SODIUM SERPL-SCNC: 138 MMOL/L (ref 136–145)
WBC NRBC COR # BLD AUTO: 10.53 10*3/MM3 (ref 3.4–10.8)

## 2025-04-24 PROCEDURE — 80053 COMPREHEN METABOLIC PANEL: CPT

## 2025-04-24 PROCEDURE — 36415 COLL VENOUS BLD VENIPUNCTURE: CPT

## 2025-04-24 PROCEDURE — 85027 COMPLETE CBC AUTOMATED: CPT

## 2025-04-24 RX ORDER — URSODIOL 300 MG/1
300 CAPSULE ORAL 2 TIMES DAILY
Qty: 60 CAPSULE | Refills: 5 | Status: SHIPPED | OUTPATIENT
Start: 2025-04-24

## 2025-04-24 RX ORDER — ENOXAPARIN SODIUM 100 MG/ML
40 INJECTION SUBCUTANEOUS EVERY 12 HOURS SCHEDULED
Qty: 11.2 ML | Refills: 0 | Status: SHIPPED | OUTPATIENT
Start: 2025-04-24 | End: 2025-05-08

## 2025-04-24 NOTE — H&P
Bariatric Consult:  Referred by Toño Vaughan MD    Alice Adams is here today for consult on Consult (Sleeve Consult )      History of Present Illness:     Alice Adams is a 39 y.o. female with morbid obesity with co-morbidities including none who presents for surgical consultation for the above procedure. Alice has completed the initial intake visit and has been examined by our nurse practitioner, dietician, psychologist and underwent the extensive educational teaching process under the guidance of our bariatric coordinator and myself. Alice also has seen or if has not yet will see the educational video CYNTHIA on the surgical procedure if available. Alice attended today more educational teaching from our bariatric coordinator and myself. Alice has had an extensive medical workup including a visit with their primary care physician, EKG, chest radiograph, blood work, EGD or UGI and possibly further testing. These have been reviewed by me and discussed with the patient. Alice is now ready to proceed with surgery. Alice presently denies nausea, vomiting, fever, chills, chest pain, shortness of air, melena, hematochezia, hemetemesis, dysuria, frequency, hematuria.     Past Medical History:   Diagnosis Date    ADHD (attention deficit hyperactivity disorder)     Anemia 2017 and 2020    postpartum    Depression     History of COVID-19     History of medical problems     Infertility, female 02/05/2025    Migraine 2008 occasional since    Miscarriage     Obesity     PMS (premenstrual syndrome) normal    Urinary tract infection 2005 constantly    Varicella as a child very severe       Encounter Diagnoses   Name Primary?    Obesity, Class III, BMI 40-49.9 (morbid obesity) Yes    Chronic fatigue     Dietary counseling        Past Surgical History:   Procedure Laterality Date    D & C AND LAPAROSCOPY      uterine polyp    ENDOSCOPY N/A 2/18/2025    Procedure: ESOPHAGOGASTRODUODENOSCOPY WITH BIOPSY;  Surgeon: Nathaniel Hernandez  Rom Barber MD;  Location: [facility] ENDOSCOPY;  Service: General;  Laterality: N/A;  PRE- DYSPEPSIA  POST- GASTRITIS    WISDOM TOOTH EXTRACTION  2003         * No active hospital problems. *      No Known Allergies      Current Outpatient Medications:     amoxicillin (AMOXIL) 500 MG capsule, Take 1 capsule by mouth 2 (Two) Times a Day for 7 days., Disp: 14 capsule, Rfl: 0    benzonatate (TESSALON) 200 MG capsule, Take 1 capsule by mouth 3 (Three) Times a Day As Needed for Cough for up to 10 days., Disp: 30 capsule, Rfl: 0    Chlorcyclizine-Pseudoephed (Stahist AD) 25-60 MG tablet, Take 1 tablet by mouth Every 8 (Eight) Hours As Needed (congestion. may substitute benadryl at night)., Disp: 30 tablet, Rfl: 0    enoxaparin sodium (LOVENOX) 40 MG/0.4ML solution prefilled syringe syringe, Inject 0.4 mL under the skin into the appropriate area as directed Every 12 (Twelve) Hours for 14 days. Start after surgery unless instructed otherwise, Disp: 11.2 mL, Rfl: 0    folic acid-vit B6-vit B12 (FOLBEE) 2.5-25-1 MG tablet tablet, Take 1 tablet by mouth Daily., Disp: 40 tablet, Rfl: 0    ursodiol (ACTIGALL) 300 MG capsule, Take 1 capsule by mouth 2 (Two) Times a Day., Disp: 60 capsule, Rfl: 5    Social History     Socioeconomic History    Marital status:    Tobacco Use    Smoking status: Never     Passive exposure: Never    Smokeless tobacco: Never   Vaping Use    Vaping status: Never Used   Substance and Sexual Activity    Alcohol use: Not Currently    Drug use: Never    Sexual activity: Yes     Partners: Male     Birth control/protection: I.U.D.       Family History   Problem Relation Age of Onset    Breast cancer Mother         BRCA -    Obesity Mother     Stroke Mother     Sleep apnea Mother     Melanoma Father     Stroke Father         has had 2 strokes    Diabetes Father     Hypertension Father     Obesity Father     Sleep apnea Father     Breast cancer Paternal Aunt         BRCA +    Breast cancer Paternal Aunt          BRCA +    Uterine cancer Paternal Aunt     Hypertension Paternal Uncle     Diabetes Maternal Grandmother     Stroke Maternal Grandfather         I believe he  of a stroke    Diabetes Maternal Grandfather     Ovarian cancer Paternal Grandmother     Prostate cancer Paternal Grandfather     Diabetes Paternal Grandfather     Hypertension Paternal Grandfather        Review of Systems:  Review of Systems   Constitutional:  Positive for fatigue.   HENT:  Positive for postnasal drip.    Respiratory:  Positive for cough.    Musculoskeletal:  Positive for arthralgias.   All other systems reviewed and are negative.      Physical Exam:  Vital Signs:  Weight: (!) 146 kg (322 lb)   Body mass index is 49.37 kg/m².  Temp: 97.7 °F (36.5 °C)   Heart Rate: 72   BP: 114/92     Physical Exam  Vitals reviewed.   HENT:      Head: Normocephalic and atraumatic.      Mouth/Throat:      Mouth: Mucous membranes are moist.      Pharynx: Oropharynx is clear.   Eyes:      General: No scleral icterus.     Extraocular Movements: Extraocular movements intact.      Conjunctiva/sclera: Conjunctivae normal.      Pupils: Pupils are equal, round, and reactive to light.   Neck:      Thyroid: No thyromegaly.   Cardiovascular:      Rate and Rhythm: Normal rate.   Pulmonary:      Effort: Pulmonary effort is normal. No respiratory distress.      Breath sounds: Normal breath sounds. No stridor. No wheezing or rhonchi.   Abdominal:      General: Bowel sounds are normal.      Palpations: Abdomen is soft.      Tenderness: There is no abdominal tenderness. There is no right CVA tenderness, left CVA tenderness, guarding or rebound.      Hernia: No hernia is present.   Musculoskeletal:         General: Normal range of motion.      Cervical back: Normal range of motion and neck supple.   Lymphadenopathy:      Cervical: No cervical adenopathy.   Skin:     General: Skin is warm and dry.      Findings: No erythema.   Neurological:      Mental Status: She is alert  and oriented to person, place, and time.   Psychiatric:         Mood and Affect: Mood normal.         Behavior: Behavior normal.         Thought Content: Thought content normal.         Judgment: Judgment normal.         Assessment:    Alice Adams is a 39 y.o. year old female with medically complicated severe obesity with a BMI of Body mass index is 49.37 kg/m². and multiple co-morbidities listed in the encounter diagnosis.    I think she is an appropriate candidate for this surgery, and is ready to proceed.    Plan/Discussion/Summary:  No hiatal hernia per me.  No PPI.  H. pylori negative    The patient has returned to the office for a surgical consultation and has requested to proceed with gastric sleeve.  I have had the opportunity to obtain a history, examine the patient and review the patient's chart. The procedure could be done laparoscopically and or robotically.    The patient understands that surgery is a tool and that weight loss is not guaranteed but only seen in the context of appropriate use, regular follow up, exercise and making appropriate food choices.     I personally discussed the potential complications of the laparoscopic gastric sleeve with this patient.  The patient is well aware of potential complications of the surgery that include but not limited to bleeding, infections, deep vein thrombosis, pulmonary embolism, pulmonary complications such as pneumonia, cardiac event, hernias, small bowel obstruction, damage to the spleen or other organs, bowel injury, disfiguring scars, failure to lose weight, need for additional surgery, conversion to an open procedure and death.  The patient is also aware of complications which apply in particular to the gastric sleeve and can include but not limited to the leakage of gastric contents at the staple line, the development of an intra-abdominal abscess, gastroesophageal reflux disease, Villaseñor's esophagus, ulcers, vitamin/mineral deficiencies,  strictures, and the possibility of converting this procedure to a Mikayla-en-Y gastric bypass. The patient also understands the possibility of requiring an acid reducer medication for the rest of their life.    The risks, benefits, potential complications and alternative therapies were discussed at great length as outlined in our extensive consent forms, online consent and educational teaching processes.    The patient has confirmed the participation in the programs extensive educational activities.    All questions and concerns were answered to patient's satisfaction.  The patient now wishes to proceed with surgery.     The patient has agreed to a postoperative course of anitcoagulant therapy.      I instructed patient that the surgery could be laparoscopically and/or robotically.    I instructed patient to start on a H2 blocker or proton pump inhibitor if not already on one of these medications.    I explained in detail the procedures that are in the consent.  All of these procedures have a chance to convert to open if any technical challenges or complications do occur.  Bariatric surgery is not cosmetic surgery but rather a tool to help a patient make a life-long commitment lifestyle change including diet, exercise, behavior changes, and taking supplemental vitamins and minerals.    Problems after surgery may require more operations to correct them.    The risks, benefits, alternatives, and potential complications of all of the procedures were explained in detail including, but not limited to death, anesthesia and medication adverse effect, deep venous thrombosis, pulmonary embolism, trocar site/incisional hernia, wound infection, abdominal infection, bleeding, failure to lose weight, gain weight, a change in body image, metabolic complications with vitamin deficiences and anemia.    Weight loss expectations were discussed with the patient in detail. The weight loss operations most commonly performed are the sleeve  gastrectomy and the Mikayla-en-Y gastric bypass. These operations result in weight losses up to approximately 25-35% of initial body weight 12 to 24 months after surgery with the gastric bypass usually the higher percent of weight loss but depends on patient using the tool.    For the gastric bypass and loop duodenal switch (MERVAT-S) the risks include but not limited to the following early complications:  Anastomotic leak/peritonitis, Mikayla/Alimentary/biliopancreatic limb obstruction, severe & minor wound infection/seroma, and nausea/vomiting.  Late complications can include but are not limited to malnutrition, vitamin deficiencies, frequent loose stools,  stomal stenosis, marginal ulcer, bowel obstruction, intussusception, internal, and incisional hernia.    Regarding the gastric sleeve, there is higher risk of dysphagia and reflux leading to possible Villaseñor's esophagus compared to a gastric bypass, as well as risk of internal visceral/organ injury, splenectomy, bleeding, infection, leak (which could require further intervention possible conversion to Mikayla-en-Y gastric bypass), stenosis and possibility of regaining weight.    Alice was counseled regarding diagnostic results, instructions for management, risk factor reductions, prognosis, patient and family education, impressions, risks and benefits of treatment options and importance of compliance with treatment. Total time of the encounter was over 45 minutes counseling the patient regarding the procedure as above and reviewing as well as ordering labs, medications and the procedure.  The chart was also reviewed prior to seeing the patient reviewing previous testing, studies and labs.    Alice understands the surgical procedures and the different surgical options that are available.  She understands the lifestyle changes that are required after surgery and has agreed to follow the guidelines outlined in the weight management program.  She also expressed  understanding of the risks involved and had all of female questions answered and desires to proceed.      Nathaniel Hernandez MD  4/24/2025

## 2025-04-24 NOTE — H&P (VIEW-ONLY)
Bariatric Consult:  Referred by Toño Vaughan MD    Alice Adams is here today for consult on Consult (Sleeve Consult )      History of Present Illness:     Alice Adams is a 39 y.o. female with morbid obesity with co-morbidities including none who presents for surgical consultation for the above procedure. Alice has completed the initial intake visit and has been examined by our nurse practitioner, dietician, psychologist and underwent the extensive educational teaching process under the guidance of our bariatric coordinator and myself. Alice also has seen or if has not yet will see the educational video CYNTHIA on the surgical procedure if available. Alice attended today more educational teaching from our bariatric coordinator and myself. Alice has had an extensive medical workup including a visit with their primary care physician, EKG, chest radiograph, blood work, EGD or UGI and possibly further testing. These have been reviewed by me and discussed with the patient. Alice is now ready to proceed with surgery. Alice presently denies nausea, vomiting, fever, chills, chest pain, shortness of air, melena, hematochezia, hemetemesis, dysuria, frequency, hematuria.     Past Medical History:   Diagnosis Date    ADHD (attention deficit hyperactivity disorder)     Anemia 2017 and 2020    postpartum    Depression     History of COVID-19     History of medical problems     Infertility, female 02/05/2025    Migraine 2008 occasional since    Miscarriage     Obesity     PMS (premenstrual syndrome) normal    Urinary tract infection 2005 constantly    Varicella as a child very severe       Encounter Diagnoses   Name Primary?    Obesity, Class III, BMI 40-49.9 (morbid obesity) Yes    Chronic fatigue     Dietary counseling        Past Surgical History:   Procedure Laterality Date    D & C AND LAPAROSCOPY      uterine polyp    ENDOSCOPY N/A 2/18/2025    Procedure: ESOPHAGOGASTRODUODENOSCOPY WITH BIOPSY;  Surgeon: Nathaniel Hernandez  Rom Barber MD;  Location: [facility] ENDOSCOPY;  Service: General;  Laterality: N/A;  PRE- DYSPEPSIA  POST- GASTRITIS    WISDOM TOOTH EXTRACTION  2003         * No active hospital problems. *      No Known Allergies      Current Outpatient Medications:     amoxicillin (AMOXIL) 500 MG capsule, Take 1 capsule by mouth 2 (Two) Times a Day for 7 days., Disp: 14 capsule, Rfl: 0    benzonatate (TESSALON) 200 MG capsule, Take 1 capsule by mouth 3 (Three) Times a Day As Needed for Cough for up to 10 days., Disp: 30 capsule, Rfl: 0    Chlorcyclizine-Pseudoephed (Stahist AD) 25-60 MG tablet, Take 1 tablet by mouth Every 8 (Eight) Hours As Needed (congestion. may substitute benadryl at night)., Disp: 30 tablet, Rfl: 0    enoxaparin sodium (LOVENOX) 40 MG/0.4ML solution prefilled syringe syringe, Inject 0.4 mL under the skin into the appropriate area as directed Every 12 (Twelve) Hours for 14 days. Start after surgery unless instructed otherwise, Disp: 11.2 mL, Rfl: 0    folic acid-vit B6-vit B12 (FOLBEE) 2.5-25-1 MG tablet tablet, Take 1 tablet by mouth Daily., Disp: 40 tablet, Rfl: 0    ursodiol (ACTIGALL) 300 MG capsule, Take 1 capsule by mouth 2 (Two) Times a Day., Disp: 60 capsule, Rfl: 5    Social History     Socioeconomic History    Marital status:    Tobacco Use    Smoking status: Never     Passive exposure: Never    Smokeless tobacco: Never   Vaping Use    Vaping status: Never Used   Substance and Sexual Activity    Alcohol use: Not Currently    Drug use: Never    Sexual activity: Yes     Partners: Male     Birth control/protection: I.U.D.       Family History   Problem Relation Age of Onset    Breast cancer Mother         BRCA -    Obesity Mother     Stroke Mother     Sleep apnea Mother     Melanoma Father     Stroke Father         has had 2 strokes    Diabetes Father     Hypertension Father     Obesity Father     Sleep apnea Father     Breast cancer Paternal Aunt         BRCA +    Breast cancer Paternal Aunt          BRCA +    Uterine cancer Paternal Aunt     Hypertension Paternal Uncle     Diabetes Maternal Grandmother     Stroke Maternal Grandfather         I believe he  of a stroke    Diabetes Maternal Grandfather     Ovarian cancer Paternal Grandmother     Prostate cancer Paternal Grandfather     Diabetes Paternal Grandfather     Hypertension Paternal Grandfather        Review of Systems:  Review of Systems   Constitutional:  Positive for fatigue.   HENT:  Positive for postnasal drip.    Respiratory:  Positive for cough.    Musculoskeletal:  Positive for arthralgias.   All other systems reviewed and are negative.      Physical Exam:  Vital Signs:  Weight: (!) 146 kg (322 lb)   Body mass index is 49.37 kg/m².  Temp: 97.7 °F (36.5 °C)   Heart Rate: 72   BP: 114/92     Physical Exam  Vitals reviewed.   HENT:      Head: Normocephalic and atraumatic.      Mouth/Throat:      Mouth: Mucous membranes are moist.      Pharynx: Oropharynx is clear.   Eyes:      General: No scleral icterus.     Extraocular Movements: Extraocular movements intact.      Conjunctiva/sclera: Conjunctivae normal.      Pupils: Pupils are equal, round, and reactive to light.   Neck:      Thyroid: No thyromegaly.   Cardiovascular:      Rate and Rhythm: Normal rate.   Pulmonary:      Effort: Pulmonary effort is normal. No respiratory distress.      Breath sounds: Normal breath sounds. No stridor. No wheezing or rhonchi.   Abdominal:      General: Bowel sounds are normal.      Palpations: Abdomen is soft.      Tenderness: There is no abdominal tenderness. There is no right CVA tenderness, left CVA tenderness, guarding or rebound.      Hernia: No hernia is present.   Musculoskeletal:         General: Normal range of motion.      Cervical back: Normal range of motion and neck supple.   Lymphadenopathy:      Cervical: No cervical adenopathy.   Skin:     General: Skin is warm and dry.      Findings: No erythema.   Neurological:      Mental Status: She is alert  and oriented to person, place, and time.   Psychiatric:         Mood and Affect: Mood normal.         Behavior: Behavior normal.         Thought Content: Thought content normal.         Judgment: Judgment normal.         Assessment:    Alice Adams is a 39 y.o. year old female with medically complicated severe obesity with a BMI of Body mass index is 49.37 kg/m². and multiple co-morbidities listed in the encounter diagnosis.    I think she is an appropriate candidate for this surgery, and is ready to proceed.    Plan/Discussion/Summary:  No hiatal hernia per me.  No PPI.  H. pylori negative    The patient has returned to the office for a surgical consultation and has requested to proceed with gastric sleeve.  I have had the opportunity to obtain a history, examine the patient and review the patient's chart. The procedure could be done laparoscopically and or robotically.    The patient understands that surgery is a tool and that weight loss is not guaranteed but only seen in the context of appropriate use, regular follow up, exercise and making appropriate food choices.     I personally discussed the potential complications of the laparoscopic gastric sleeve with this patient.  The patient is well aware of potential complications of the surgery that include but not limited to bleeding, infections, deep vein thrombosis, pulmonary embolism, pulmonary complications such as pneumonia, cardiac event, hernias, small bowel obstruction, damage to the spleen or other organs, bowel injury, disfiguring scars, failure to lose weight, need for additional surgery, conversion to an open procedure and death.  The patient is also aware of complications which apply in particular to the gastric sleeve and can include but not limited to the leakage of gastric contents at the staple line, the development of an intra-abdominal abscess, gastroesophageal reflux disease, Villaseñor's esophagus, ulcers, vitamin/mineral deficiencies,  strictures, and the possibility of converting this procedure to a Mikayla-en-Y gastric bypass. The patient also understands the possibility of requiring an acid reducer medication for the rest of their life.    The risks, benefits, potential complications and alternative therapies were discussed at great length as outlined in our extensive consent forms, online consent and educational teaching processes.    The patient has confirmed the participation in the programs extensive educational activities.    All questions and concerns were answered to patient's satisfaction.  The patient now wishes to proceed with surgery.     The patient has agreed to a postoperative course of anitcoagulant therapy.      I instructed patient that the surgery could be laparoscopically and/or robotically.    I instructed patient to start on a H2 blocker or proton pump inhibitor if not already on one of these medications.    I explained in detail the procedures that are in the consent.  All of these procedures have a chance to convert to open if any technical challenges or complications do occur.  Bariatric surgery is not cosmetic surgery but rather a tool to help a patient make a life-long commitment lifestyle change including diet, exercise, behavior changes, and taking supplemental vitamins and minerals.    Problems after surgery may require more operations to correct them.    The risks, benefits, alternatives, and potential complications of all of the procedures were explained in detail including, but not limited to death, anesthesia and medication adverse effect, deep venous thrombosis, pulmonary embolism, trocar site/incisional hernia, wound infection, abdominal infection, bleeding, failure to lose weight, gain weight, a change in body image, metabolic complications with vitamin deficiences and anemia.    Weight loss expectations were discussed with the patient in detail. The weight loss operations most commonly performed are the sleeve  gastrectomy and the Mikayla-en-Y gastric bypass. These operations result in weight losses up to approximately 25-35% of initial body weight 12 to 24 months after surgery with the gastric bypass usually the higher percent of weight loss but depends on patient using the tool.    For the gastric bypass and loop duodenal switch (MERVAT-S) the risks include but not limited to the following early complications:  Anastomotic leak/peritonitis, Mikayla/Alimentary/biliopancreatic limb obstruction, severe & minor wound infection/seroma, and nausea/vomiting.  Late complications can include but are not limited to malnutrition, vitamin deficiencies, frequent loose stools,  stomal stenosis, marginal ulcer, bowel obstruction, intussusception, internal, and incisional hernia.    Regarding the gastric sleeve, there is higher risk of dysphagia and reflux leading to possible Villaseñor's esophagus compared to a gastric bypass, as well as risk of internal visceral/organ injury, splenectomy, bleeding, infection, leak (which could require further intervention possible conversion to Mikayla-en-Y gastric bypass), stenosis and possibility of regaining weight.    Alice was counseled regarding diagnostic results, instructions for management, risk factor reductions, prognosis, patient and family education, impressions, risks and benefits of treatment options and importance of compliance with treatment. Total time of the encounter was over 45 minutes counseling the patient regarding the procedure as above and reviewing as well as ordering labs, medications and the procedure.  The chart was also reviewed prior to seeing the patient reviewing previous testing, studies and labs.    Alice understands the surgical procedures and the different surgical options that are available.  She understands the lifestyle changes that are required after surgery and has agreed to follow the guidelines outlined in the weight management program.  She also expressed  understanding of the risks involved and had all of female questions answered and desires to proceed.      Nathaniel Hernandez MD  4/24/2025

## 2025-04-24 NOTE — TELEPHONE ENCOUNTER
Pt had left me a message asking about folic acid over the counter. I left her a voice mail saying that is good and most insurance does not cover.

## 2025-04-24 NOTE — PATIENT INSTRUCTIONS
Bariatric Manual    You were provided a manual specific to the procedure that you have chosen.  Please refer to that with any questions or call the office at [phone]

## 2025-05-05 ENCOUNTER — TELEPHONE (OUTPATIENT)
Dept: BARIATRICS/WEIGHT MGMT | Facility: CLINIC | Age: 40
End: 2025-05-05
Payer: COMMERCIAL

## 2025-05-10 ENCOUNTER — ANESTHESIA EVENT (OUTPATIENT)
Dept: PERIOP | Facility: HOSPITAL | Age: 40
DRG: 621 | End: 2025-05-10
Payer: COMMERCIAL

## 2025-05-12 ENCOUNTER — ANESTHESIA (OUTPATIENT)
Dept: PERIOP | Facility: HOSPITAL | Age: 40
DRG: 621 | End: 2025-05-12
Payer: COMMERCIAL

## 2025-05-12 ENCOUNTER — HOSPITAL ENCOUNTER (INPATIENT)
Facility: HOSPITAL | Age: 40
LOS: 1 days | Discharge: HOME | DRG: 621 | End: 2025-05-12
Attending: SURGERY | Admitting: SURGERY
Payer: COMMERCIAL

## 2025-05-12 ENCOUNTER — READMISSION MANAGEMENT (OUTPATIENT)
Dept: CALL CENTER | Facility: HOSPITAL | Age: 40
End: 2025-05-12
Payer: COMMERCIAL

## 2025-05-12 VITALS
SYSTOLIC BLOOD PRESSURE: 120 MMHG | RESPIRATION RATE: 16 BRPM | DIASTOLIC BLOOD PRESSURE: 73 MMHG | HEART RATE: 59 BPM | OXYGEN SATURATION: 99 % | TEMPERATURE: 97.5 F

## 2025-05-12 DIAGNOSIS — N93.9 ABNORMAL UTERINE BLEEDING: ICD-10-CM

## 2025-05-12 DIAGNOSIS — O02.1 MISSED ABORTION: ICD-10-CM

## 2025-05-12 DIAGNOSIS — R53.82 CHRONIC FATIGUE: ICD-10-CM

## 2025-05-12 DIAGNOSIS — E66.01 OBESITY, CLASS III, BMI 40-49.9 (MORBID OBESITY): ICD-10-CM

## 2025-05-12 DIAGNOSIS — Z71.3 DIETARY COUNSELING: Primary | ICD-10-CM

## 2025-05-12 DIAGNOSIS — Z01.818 PRE-OP EVALUATION: ICD-10-CM

## 2025-05-12 DIAGNOSIS — M62.838 LEVATOR SPASM: ICD-10-CM

## 2025-05-12 DIAGNOSIS — L40.9 PSORIASIS: ICD-10-CM

## 2025-05-12 DIAGNOSIS — R06.83 SNORING: ICD-10-CM

## 2025-05-12 DIAGNOSIS — N97.9 INFERTILITY, FEMALE: ICD-10-CM

## 2025-05-12 DIAGNOSIS — O14.93 PRE-ECLAMPSIA IN THIRD TRIMESTER: ICD-10-CM

## 2025-05-12 PROCEDURE — 25010000002 KETOROLAC TROMETHAMINE PER 15 MG: Performed by: SURGERY

## 2025-05-12 PROCEDURE — 25010000002 THIAMINE PER 100 MG: Performed by: SURGERY

## 2025-05-12 PROCEDURE — 8E0W4CZ ROBOTIC ASSISTED PROCEDURE OF TRUNK REGION, PERCUTANEOUS ENDOSCOPIC APPROACH: ICD-10-PCS | Performed by: SURGERY

## 2025-05-12 PROCEDURE — 25010000002 CLONIDINE PER 1 MG: Performed by: SURGERY

## 2025-05-12 PROCEDURE — 25010000002 HYDROMORPHONE PER 4 MG

## 2025-05-12 PROCEDURE — 81025 URINE PREGNANCY TEST: CPT | Performed by: ANESTHESIOLOGY

## 2025-05-12 PROCEDURE — 25810000003 SODIUM CHLORIDE 0.9 % SOLUTION: Performed by: SURGERY

## 2025-05-12 PROCEDURE — 88307 TISSUE EXAM BY PATHOLOGIST: CPT | Performed by: SURGERY

## 2025-05-12 PROCEDURE — 25010000002 ROPIVACAINE PER 1 MG: Performed by: SURGERY

## 2025-05-12 PROCEDURE — 0DB64Z3 EXCISION OF STOMACH, PERCUTANEOUS ENDOSCOPIC APPROACH, VERTICAL: ICD-10-PCS | Performed by: SURGERY

## 2025-05-12 PROCEDURE — 25010000002 LIDOCAINE PF 2% 2 % SOLUTION

## 2025-05-12 PROCEDURE — 25010000002 PROPOFOL 10 MG/ML EMULSION

## 2025-05-12 PROCEDURE — 25010000002 FOSAPREPITANT PER 1 MG: Performed by: SURGERY

## 2025-05-12 PROCEDURE — 25010000002 ONDANSETRON PER 1 MG

## 2025-05-12 PROCEDURE — 25010000002 METOCLOPRAMIDE PER 10 MG: Performed by: SURGERY

## 2025-05-12 PROCEDURE — 25010000002 ENOXAPARIN PER 10 MG: Performed by: SURGERY

## 2025-05-12 PROCEDURE — 25010000002 FENTANYL CITRATE (PF) 50 MCG/ML SOLUTION

## 2025-05-12 PROCEDURE — 25010000002 SUGAMMADEX 200 MG/2ML SOLUTION

## 2025-05-12 PROCEDURE — 25010000002 EPINEPHRINE 1 MG/ML SOLUTION 30 ML VIAL: Performed by: SURGERY

## 2025-05-12 PROCEDURE — 25010000002 MAGNESIUM SULFATE PER 500 MG OF MAGNESIUM

## 2025-05-12 PROCEDURE — C1781 MESH (IMPLANTABLE): HCPCS | Performed by: SURGERY

## 2025-05-12 PROCEDURE — 25010000002 CEFAZOLIN 3 G RECONSTITUTED SOLUTION 1 EACH VIAL: Performed by: SURGERY

## 2025-05-12 PROCEDURE — 25010000002 DEXAMETHASONE SODIUM PHOSPHATE 20 MG/5ML SOLUTION

## 2025-05-12 PROCEDURE — 25010000002 AMISULPRIDE (ANTIEMETIC) 5 MG/2ML SOLUTION: Performed by: SURGERY

## 2025-05-12 PROCEDURE — 25810000003 LACTATED RINGERS PER 1000 ML: Performed by: ANESTHESIOLOGY

## 2025-05-12 PROCEDURE — 25010000002 CYANOCOBALAMIN PER 1000 MCG: Performed by: SURGERY

## 2025-05-12 PROCEDURE — 25010000002 ACETAMINOPHEN 10 MG/ML SOLUTION

## 2025-05-12 PROCEDURE — 25810000003 LACTATED RINGERS SOLUTION: Performed by: SURGERY

## 2025-05-12 PROCEDURE — 25810000003 LACTATED RINGERS PER 1000 ML: Performed by: SURGERY

## 2025-05-12 PROCEDURE — 25010000002 MIDAZOLAM PER 1 MG: Performed by: ANESTHESIOLOGY

## 2025-05-12 DEVICE — TITAN SGS STAPLER WITH TITAN SGS STANDARD STAPLE-LINE REINFORCEMENT
Type: IMPLANTABLE DEVICE | Site: STOMACH | Status: FUNCTIONAL
Brand: TELEFLEX

## 2025-05-12 RX ORDER — PHENYLEPHRINE HCL IN 0.9% NACL 1 MG/10 ML
SYRINGE (ML) INTRAVENOUS AS NEEDED
Status: DISCONTINUED | OUTPATIENT
Start: 2025-05-12 | End: 2025-05-12 | Stop reason: SURG

## 2025-05-12 RX ORDER — CHLORHEXIDINE GLUCONATE ORAL RINSE 1.2 MG/ML
15 SOLUTION DENTAL SEE ADMIN INSTRUCTIONS
Status: COMPLETED | OUTPATIENT
Start: 2025-05-12 | End: 2025-05-12

## 2025-05-12 RX ORDER — DIPHENHYDRAMINE HYDROCHLORIDE 50 MG/ML
12.5 INJECTION, SOLUTION INTRAMUSCULAR; INTRAVENOUS
Status: DISCONTINUED | OUTPATIENT
Start: 2025-05-12 | End: 2025-05-12 | Stop reason: HOSPADM

## 2025-05-12 RX ORDER — LIDOCAINE HYDROCHLORIDE 20 MG/ML
INJECTION, SOLUTION EPIDURAL; INFILTRATION; INTRACAUDAL; PERINEURAL AS NEEDED
Status: DISCONTINUED | OUTPATIENT
Start: 2025-05-12 | End: 2025-05-12 | Stop reason: SURG

## 2025-05-12 RX ORDER — NALOXONE HCL 0.4 MG/ML
0.2 VIAL (ML) INJECTION AS NEEDED
Status: DISCONTINUED | OUTPATIENT
Start: 2025-05-12 | End: 2025-05-12 | Stop reason: HOSPADM

## 2025-05-12 RX ORDER — CYANOCOBALAMIN 1000 UG/ML
1000 INJECTION, SOLUTION INTRAMUSCULAR; SUBCUTANEOUS ONCE
Status: COMPLETED | OUTPATIENT
Start: 2025-05-12 | End: 2025-05-12

## 2025-05-12 RX ORDER — FAMOTIDINE 10 MG/ML
20 INJECTION, SOLUTION INTRAVENOUS ONCE
Status: DISCONTINUED | OUTPATIENT
Start: 2025-05-12 | End: 2025-05-12 | Stop reason: HOSPADM

## 2025-05-12 RX ORDER — MAGNESIUM HYDROXIDE 1200 MG/15ML
LIQUID ORAL AS NEEDED
Status: DISCONTINUED | OUTPATIENT
Start: 2025-05-12 | End: 2025-05-12 | Stop reason: HOSPADM

## 2025-05-12 RX ORDER — LABETALOL HYDROCHLORIDE 5 MG/ML
5 INJECTION, SOLUTION INTRAVENOUS
Status: DISCONTINUED | OUTPATIENT
Start: 2025-05-12 | End: 2025-05-12 | Stop reason: HOSPADM

## 2025-05-12 RX ORDER — THIAMINE HYDROCHLORIDE 100 MG/ML
200 INJECTION, SOLUTION INTRAMUSCULAR; INTRAVENOUS ONCE
Status: COMPLETED | OUTPATIENT
Start: 2025-05-12 | End: 2025-05-12

## 2025-05-12 RX ORDER — GABAPENTIN 300 MG/1
600 CAPSULE ORAL ONCE
Status: COMPLETED | OUTPATIENT
Start: 2025-05-12 | End: 2025-05-12

## 2025-05-12 RX ORDER — SODIUM CHLORIDE 0.9 % (FLUSH) 0.9 %
3-10 SYRINGE (ML) INJECTION AS NEEDED
Status: DISCONTINUED | OUTPATIENT
Start: 2025-05-12 | End: 2025-05-12 | Stop reason: HOSPADM

## 2025-05-12 RX ORDER — PROPOFOL 10 MG/ML
VIAL (ML) INTRAVENOUS AS NEEDED
Status: DISCONTINUED | OUTPATIENT
Start: 2025-05-12 | End: 2025-05-12 | Stop reason: SURG

## 2025-05-12 RX ORDER — HYDROMORPHONE HYDROCHLORIDE 1 MG/ML
0.5 INJECTION, SOLUTION INTRAMUSCULAR; INTRAVENOUS; SUBCUTANEOUS
Status: DISCONTINUED | OUTPATIENT
Start: 2025-05-12 | End: 2025-05-12 | Stop reason: HOSPADM

## 2025-05-12 RX ORDER — ONDANSETRON 4 MG/1
4 TABLET, ORALLY DISINTEGRATING ORAL EVERY 4 HOURS PRN
Qty: 30 TABLET | Refills: 0 | Status: SHIPPED | OUTPATIENT
Start: 2025-05-12

## 2025-05-12 RX ORDER — FENTANYL CITRATE 50 UG/ML
INJECTION, SOLUTION INTRAMUSCULAR; INTRAVENOUS AS NEEDED
Status: DISCONTINUED | OUTPATIENT
Start: 2025-05-12 | End: 2025-05-12 | Stop reason: SURG

## 2025-05-12 RX ORDER — SODIUM CHLORIDE 0.9 % (FLUSH) 0.9 %
3 SYRINGE (ML) INJECTION EVERY 12 HOURS SCHEDULED
Status: DISCONTINUED | OUTPATIENT
Start: 2025-05-12 | End: 2025-05-12 | Stop reason: HOSPADM

## 2025-05-12 RX ORDER — SODIUM CHLORIDE 9 MG/ML
40 INJECTION, SOLUTION INTRAVENOUS AS NEEDED
Status: DISCONTINUED | OUTPATIENT
Start: 2025-05-12 | End: 2025-05-12 | Stop reason: HOSPADM

## 2025-05-12 RX ORDER — MIDAZOLAM HYDROCHLORIDE 1 MG/ML
1 INJECTION, SOLUTION INTRAMUSCULAR; INTRAVENOUS
Status: DISCONTINUED | OUTPATIENT
Start: 2025-05-12 | End: 2025-05-12 | Stop reason: HOSPADM

## 2025-05-12 RX ORDER — DROPERIDOL 2.5 MG/ML
0.62 INJECTION, SOLUTION INTRAMUSCULAR; INTRAVENOUS
Status: DISCONTINUED | OUTPATIENT
Start: 2025-05-12 | End: 2025-05-12 | Stop reason: HOSPADM

## 2025-05-12 RX ORDER — KETAMINE HCL IN NACL, ISO-OSM 100MG/10ML
SYRINGE (ML) INJECTION AS NEEDED
Status: DISCONTINUED | OUTPATIENT
Start: 2025-05-12 | End: 2025-05-12 | Stop reason: SURG

## 2025-05-12 RX ORDER — SODIUM CHLORIDE, SODIUM LACTATE, POTASSIUM CHLORIDE, CALCIUM CHLORIDE 600; 310; 30; 20 MG/100ML; MG/100ML; MG/100ML; MG/100ML
100 INJECTION, SOLUTION INTRAVENOUS CONTINUOUS
Status: DISCONTINUED | OUTPATIENT
Start: 2025-05-12 | End: 2025-05-12 | Stop reason: HOSPADM

## 2025-05-12 RX ORDER — HYDROCODONE BITARTRATE AND ACETAMINOPHEN 5; 325 MG/1; MG/1
1 TABLET ORAL ONCE AS NEEDED
Status: COMPLETED | OUTPATIENT
Start: 2025-05-12 | End: 2025-05-12

## 2025-05-12 RX ORDER — DIPHENHYDRAMINE HYDROCHLORIDE 50 MG/ML
25 INJECTION, SOLUTION INTRAMUSCULAR; INTRAVENOUS EVERY 4 HOURS PRN
Status: DISCONTINUED | OUTPATIENT
Start: 2025-05-12 | End: 2025-05-12 | Stop reason: HOSPADM

## 2025-05-12 RX ORDER — PROMETHAZINE HYDROCHLORIDE 25 MG/1
25 TABLET ORAL ONCE AS NEEDED
Status: DISCONTINUED | OUTPATIENT
Start: 2025-05-12 | End: 2025-05-12 | Stop reason: HOSPADM

## 2025-05-12 RX ORDER — ONDANSETRON 2 MG/ML
INJECTION INTRAMUSCULAR; INTRAVENOUS AS NEEDED
Status: DISCONTINUED | OUTPATIENT
Start: 2025-05-12 | End: 2025-05-12 | Stop reason: SURG

## 2025-05-12 RX ORDER — SODIUM CHLORIDE, SODIUM LACTATE, POTASSIUM CHLORIDE, CALCIUM CHLORIDE 600; 310; 30; 20 MG/100ML; MG/100ML; MG/100ML; MG/100ML
9 INJECTION, SOLUTION INTRAVENOUS CONTINUOUS
Status: DISCONTINUED | OUTPATIENT
Start: 2025-05-12 | End: 2025-05-12 | Stop reason: HOSPADM

## 2025-05-12 RX ORDER — ONDANSETRON 2 MG/ML
4 INJECTION INTRAMUSCULAR; INTRAVENOUS ONCE AS NEEDED
Status: COMPLETED | OUTPATIENT
Start: 2025-05-12 | End: 2025-05-12

## 2025-05-12 RX ORDER — DEXAMETHASONE SODIUM PHOSPHATE 4 MG/ML
INJECTION, SOLUTION INTRA-ARTICULAR; INTRALESIONAL; INTRAMUSCULAR; INTRAVENOUS; SOFT TISSUE AS NEEDED
Status: DISCONTINUED | OUTPATIENT
Start: 2025-05-12 | End: 2025-05-12 | Stop reason: SURG

## 2025-05-12 RX ORDER — MAGNESIUM SULFATE HEPTAHYDRATE 500 MG/ML
INJECTION, SOLUTION INTRAMUSCULAR; INTRAVENOUS AS NEEDED
Status: DISCONTINUED | OUTPATIENT
Start: 2025-05-12 | End: 2025-05-12 | Stop reason: SURG

## 2025-05-12 RX ORDER — SODIUM CHLORIDE 0.9 % (FLUSH) 0.9 %
10 SYRINGE (ML) INJECTION AS NEEDED
Status: DISCONTINUED | OUTPATIENT
Start: 2025-05-12 | End: 2025-05-12 | Stop reason: HOSPADM

## 2025-05-12 RX ORDER — HYDRALAZINE HYDROCHLORIDE 20 MG/ML
5 INJECTION INTRAMUSCULAR; INTRAVENOUS
Status: DISCONTINUED | OUTPATIENT
Start: 2025-05-12 | End: 2025-05-12 | Stop reason: HOSPADM

## 2025-05-12 RX ORDER — ATROPINE SULFATE 0.4 MG/ML
0.4 INJECTION, SOLUTION INTRAMUSCULAR; INTRAVENOUS; SUBCUTANEOUS ONCE AS NEEDED
Status: DISCONTINUED | OUTPATIENT
Start: 2025-05-12 | End: 2025-05-12 | Stop reason: HOSPADM

## 2025-05-12 RX ORDER — TRAMADOL HYDROCHLORIDE 50 MG/1
50 TABLET ORAL EVERY 6 HOURS PRN
Qty: 12 TABLET | Refills: 0 | Status: SHIPPED | OUTPATIENT
Start: 2025-05-12

## 2025-05-12 RX ORDER — EPHEDRINE SULFATE 50 MG/ML
5 INJECTION, SOLUTION INTRAVENOUS ONCE AS NEEDED
Status: DISCONTINUED | OUTPATIENT
Start: 2025-05-12 | End: 2025-05-12 | Stop reason: HOSPADM

## 2025-05-12 RX ORDER — ROCURONIUM BROMIDE 10 MG/ML
INJECTION, SOLUTION INTRAVENOUS AS NEEDED
Status: DISCONTINUED | OUTPATIENT
Start: 2025-05-12 | End: 2025-05-12 | Stop reason: SURG

## 2025-05-12 RX ORDER — ACETAMINOPHEN 10 MG/ML
INJECTION, SOLUTION INTRAVENOUS AS NEEDED
Status: DISCONTINUED | OUTPATIENT
Start: 2025-05-12 | End: 2025-05-12 | Stop reason: SURG

## 2025-05-12 RX ORDER — OXYCODONE AND ACETAMINOPHEN 7.5; 325 MG/1; MG/1
1 TABLET ORAL EVERY 4 HOURS PRN
Status: DISCONTINUED | OUTPATIENT
Start: 2025-05-12 | End: 2025-05-12 | Stop reason: HOSPADM

## 2025-05-12 RX ORDER — HYOSCYAMINE SULFATE 0.12 MG/1
125 TABLET SUBLINGUAL EVERY 6 HOURS PRN
Status: DISCONTINUED | OUTPATIENT
Start: 2025-05-12 | End: 2025-05-12 | Stop reason: HOSPADM

## 2025-05-12 RX ORDER — LIDOCAINE HYDROCHLORIDE 10 MG/ML
0.5 INJECTION, SOLUTION INFILTRATION; PERINEURAL ONCE AS NEEDED
Status: DISCONTINUED | OUTPATIENT
Start: 2025-05-12 | End: 2025-05-12 | Stop reason: HOSPADM

## 2025-05-12 RX ORDER — FENTANYL CITRATE 50 UG/ML
50 INJECTION, SOLUTION INTRAMUSCULAR; INTRAVENOUS ONCE AS NEEDED
Status: DISCONTINUED | OUTPATIENT
Start: 2025-05-12 | End: 2025-05-12 | Stop reason: HOSPADM

## 2025-05-12 RX ORDER — PROMETHAZINE HYDROCHLORIDE 25 MG/1
25 SUPPOSITORY RECTAL ONCE AS NEEDED
Status: DISCONTINUED | OUTPATIENT
Start: 2025-05-12 | End: 2025-05-12 | Stop reason: HOSPADM

## 2025-05-12 RX ORDER — SODIUM CHLORIDE, SODIUM LACTATE, POTASSIUM CHLORIDE, CALCIUM CHLORIDE 600; 310; 30; 20 MG/100ML; MG/100ML; MG/100ML; MG/100ML
9 INJECTION, SOLUTION INTRAVENOUS CONTINUOUS PRN
Status: DISCONTINUED | OUTPATIENT
Start: 2025-05-12 | End: 2025-05-12 | Stop reason: HOSPADM

## 2025-05-12 RX ORDER — TRANEXAMIC ACID 100 MG/ML
INJECTION, SOLUTION INTRAVENOUS AS NEEDED
Status: DISCONTINUED | OUTPATIENT
Start: 2025-05-12 | End: 2025-05-12 | Stop reason: SURG

## 2025-05-12 RX ORDER — SODIUM CHLORIDE 0.9 % (FLUSH) 0.9 %
10 SYRINGE (ML) INJECTION EVERY 12 HOURS SCHEDULED
Status: DISCONTINUED | OUTPATIENT
Start: 2025-05-12 | End: 2025-05-12 | Stop reason: HOSPADM

## 2025-05-12 RX ORDER — PROCHLORPERAZINE EDISYLATE 5 MG/ML
10 INJECTION INTRAMUSCULAR; INTRAVENOUS AS NEEDED
Status: DISCONTINUED | OUTPATIENT
Start: 2025-05-12 | End: 2025-05-12 | Stop reason: HOSPADM

## 2025-05-12 RX ORDER — SCOPOLAMINE 1 MG/3D
1 PATCH, EXTENDED RELEASE TRANSDERMAL CONTINUOUS
Status: DISCONTINUED | OUTPATIENT
Start: 2025-05-12 | End: 2025-05-12 | Stop reason: HOSPADM

## 2025-05-12 RX ORDER — ENOXAPARIN SODIUM 100 MG/ML
INJECTION SUBCUTANEOUS
COMMUNITY

## 2025-05-12 RX ORDER — ENOXAPARIN SODIUM 100 MG/ML
40 INJECTION SUBCUTANEOUS ONCE
Status: COMPLETED | OUTPATIENT
Start: 2025-05-12 | End: 2025-05-12

## 2025-05-12 RX ORDER — METOCLOPRAMIDE HYDROCHLORIDE 5 MG/ML
10 INJECTION INTRAMUSCULAR; INTRAVENOUS ONCE
Status: COMPLETED | OUTPATIENT
Start: 2025-05-12 | End: 2025-05-12

## 2025-05-12 RX ORDER — FLUMAZENIL 0.1 MG/ML
0.2 INJECTION INTRAVENOUS AS NEEDED
Status: DISCONTINUED | OUTPATIENT
Start: 2025-05-12 | End: 2025-05-12 | Stop reason: HOSPADM

## 2025-05-12 RX ORDER — PANTOPRAZOLE SODIUM 40 MG/10ML
40 INJECTION, POWDER, LYOPHILIZED, FOR SOLUTION INTRAVENOUS ONCE
Status: COMPLETED | OUTPATIENT
Start: 2025-05-12 | End: 2025-05-12

## 2025-05-12 RX ORDER — IPRATROPIUM BROMIDE AND ALBUTEROL SULFATE 2.5; .5 MG/3ML; MG/3ML
3 SOLUTION RESPIRATORY (INHALATION) ONCE AS NEEDED
Status: DISCONTINUED | OUTPATIENT
Start: 2025-05-12 | End: 2025-05-12 | Stop reason: HOSPADM

## 2025-05-12 RX ORDER — FENTANYL CITRATE 50 UG/ML
50 INJECTION, SOLUTION INTRAMUSCULAR; INTRAVENOUS
Status: DISCONTINUED | OUTPATIENT
Start: 2025-05-12 | End: 2025-05-12 | Stop reason: HOSPADM

## 2025-05-12 RX ADMIN — SODIUM CHLORIDE, POTASSIUM CHLORIDE, SODIUM LACTATE AND CALCIUM CHLORIDE: 600; 310; 30; 20 INJECTION, SOLUTION INTRAVENOUS at 07:05

## 2025-05-12 RX ADMIN — THIAMINE HYDROCHLORIDE 200 MG: 100 INJECTION, SOLUTION INTRAMUSCULAR; INTRAVENOUS at 08:27

## 2025-05-12 RX ADMIN — METOCLOPRAMIDE 10 MG: 5 INJECTION, SOLUTION INTRAMUSCULAR; INTRAVENOUS at 06:28

## 2025-05-12 RX ADMIN — SODIUM CHLORIDE 3 G: 900 INJECTION INTRAVENOUS at 06:59

## 2025-05-12 RX ADMIN — HYDROMORPHONE HYDROCHLORIDE 0.5 MG: 1 INJECTION, SOLUTION INTRAMUSCULAR; INTRAVENOUS; SUBCUTANEOUS at 08:23

## 2025-05-12 RX ADMIN — ONDANSETRON 4 MG: 2 INJECTION, SOLUTION INTRAMUSCULAR; INTRAVENOUS at 07:56

## 2025-05-12 RX ADMIN — MAGNESIUM SULFATE HEPTAHYDRATE 2 G: 500 INJECTION, SOLUTION INTRAMUSCULAR; INTRAVENOUS at 07:20

## 2025-05-12 RX ADMIN — ONDANSETRON 4 MG: 2 INJECTION, SOLUTION INTRAMUSCULAR; INTRAVENOUS at 13:27

## 2025-05-12 RX ADMIN — SODIUM CHLORIDE, POTASSIUM CHLORIDE, SODIUM LACTATE AND CALCIUM CHLORIDE 100 ML/HR: 600; 310; 30; 20 INJECTION, SOLUTION INTRAVENOUS at 09:00

## 2025-05-12 RX ADMIN — ACETAMINOPHEN 1000 MG: 1000 INJECTION INTRAVENOUS at 07:18

## 2025-05-12 RX ADMIN — PROPOFOL 200 MG: 10 INJECTION, EMULSION INTRAVENOUS at 07:05

## 2025-05-12 RX ADMIN — ROCURONIUM BROMIDE 60 MG: 10 INJECTION, SOLUTION INTRAVENOUS at 07:05

## 2025-05-12 RX ADMIN — SODIUM CHLORIDE 500 ML: 9 INJECTION, SOLUTION INTRAVENOUS at 06:30

## 2025-05-12 RX ADMIN — PROPOFOL 150 MCG/KG/MIN: 10 INJECTION, EMULSION INTRAVENOUS at 07:06

## 2025-05-12 RX ADMIN — PANTOPRAZOLE SODIUM 40 MG: 40 INJECTION, POWDER, FOR SOLUTION INTRAVENOUS at 06:29

## 2025-05-12 RX ADMIN — HYDROCODONE BITARTRATE AND ACETAMINOPHEN 1 TABLET: 5; 325 TABLET ORAL at 13:26

## 2025-05-12 RX ADMIN — MIDAZOLAM 1 MG: 1 INJECTION INTRAMUSCULAR; INTRAVENOUS at 06:30

## 2025-05-12 RX ADMIN — LIDOCAINE HYDROCHLORIDE 100 MG: 20 INJECTION, SOLUTION EPIDURAL; INFILTRATION; INTRACAUDAL; PERINEURAL at 07:05

## 2025-05-12 RX ADMIN — DEXAMETHASONE SODIUM PHOSPHATE 10 MG: 4 INJECTION, SOLUTION INTRAMUSCULAR; INTRAVENOUS at 07:12

## 2025-05-12 RX ADMIN — FENTANYL CITRATE 50 MCG: 50 INJECTION, SOLUTION INTRAMUSCULAR; INTRAVENOUS at 07:05

## 2025-05-12 RX ADMIN — CYANOCOBALAMIN 1000 MCG: 1000 INJECTION, SOLUTION INTRAMUSCULAR; SUBCUTANEOUS at 08:27

## 2025-05-12 RX ADMIN — ENOXAPARIN SODIUM 40 MG: 100 INJECTION SUBCUTANEOUS at 08:27

## 2025-05-12 RX ADMIN — ROCURONIUM BROMIDE 20 MG: 10 INJECTION, SOLUTION INTRAVENOUS at 07:25

## 2025-05-12 RX ADMIN — CHLORHEXIDINE GLUCONATE 15 ML: 1.2 RINSE ORAL at 06:09

## 2025-05-12 RX ADMIN — TRANEXAMIC ACID 1000 MG: 100 INJECTION INTRAVENOUS at 07:14

## 2025-05-12 RX ADMIN — FENTANYL CITRATE 50 MCG: 50 INJECTION, SOLUTION INTRAMUSCULAR; INTRAVENOUS at 08:45

## 2025-05-12 RX ADMIN — SUGAMMADEX 200 MG: 100 INJECTION, SOLUTION INTRAVENOUS at 08:00

## 2025-05-12 RX ADMIN — Medication 100 MCG: at 07:18

## 2025-05-12 RX ADMIN — Medication 20 MG: at 07:15

## 2025-05-12 RX ADMIN — GABAPENTIN 600 MG: 300 CAPSULE ORAL at 06:09

## 2025-05-12 RX ADMIN — SCOPOLAMINE 1 PATCH: 1.5 PATCH, EXTENDED RELEASE TRANSDERMAL at 06:08

## 2025-05-12 RX ADMIN — FOSAPREPITANT 150 MG: 150 INJECTION, POWDER, LYOPHILIZED, FOR SOLUTION INTRAVENOUS at 06:29

## 2025-05-12 RX ADMIN — AMISULPRIDE 10 MG: 2.5 INJECTION, SOLUTION INTRAVENOUS at 08:23

## 2025-05-12 NOTE — OUTREACH NOTE
Prep Survey      Flowsheet Row Responses   [facility] patient discharged from? [place]   Is LACE score < 7 ? Yes   Eligibility [facility]   Date of Admission 05/12/25   Date of Discharge 05/12/25   Discharge Disposition Home or Self Care   Discharge diagnosis Obesity, Class III, BMI 40-49.9 (morbid obesity)   Does the patient have one of the following disease processes/diagnoses(primary or secondary)? General Surgery   Does the patient have Home health ordered? No   Is there a DME ordered? No   Medication alerts for this patient see AVS   Prep survey completed? Yes            Bhavya CARRASCO - Registered Nurse              Bhavya CARRASCO - Registered Nurse

## 2025-05-12 NOTE — OP NOTE
PREOPERATIVE DIAGNOSIS:  Morbid obesity with multiple comorbidities as referenced in the most recent history and physical.    POSTOPERATIVE DIAGNOSIS:  Morbid obesity with multiple comorbidities as referenced in the most recent history and physical.    PROCEDURES PERFORMED:  1.  Robotic assisted laparoscopic sleeve gastrectomy with Titan Stapler with buttress # [id number]    SURGEON:  Nathaniel Hernandez Jr., FACS, CHRISTIANMBS    ASSISTANT: BRANDT Muñoz      Surgery assisted and facilitated by a certified physician assistant, who directly resulted in a decreased operative time, anesthetic time, wound exposure, and possibly of an operative wound infection, thereby decreasing patient morbidity and ultimately total expenditures.  The surgical assistant assisted in placement of trochars, take down of the gastrocolic omentum, short gastric vessels and dissection at the angle of His.  Also assisted in retraction of the stomach during stapling so as not to kink the gastric sleeve.  Also assisted in removing of the gastric specimen, closure of the fascial defect as well as closure of the skin incisions.    ANESTHESIA:  General endotracheal and laparoscopic TAP block with ropivacaine mixture    ESTIMATED BLOOD LOSS:   Less than 25 mL unless dictated below.    FLUIDS:  Crystalloids.    SPECIMENS:  Gastric remnant    DRAINS:  None.    COUNTS:  Correct.    COMPLICATIONS:  None.    INDICATIONS:  This patient with morbid obesity and associated comorbidities presents for elective laparoscopic,robotic, possible open sleeve gastrectomy.  The patient has received medical clearance to proceed.  The patient has undergone our extensive educational process and consent process and wishes to proceed.    DESCRIPTION OF PROCEDURE:  The patient was brought to the operating room and placed supine upon the operating room table. SCD hose were placed.  The patient underwent uneventful general endotracheal anesthesia per the anesthesiology staff. The  abdomen was prepped with ChloraPrep and draped in the usual sterile fashion. Anesthesia staff passed a 38-Bolivian bougie into the stomach to decompress the stomach and then was pulled back to the esophagus.      An 8 mm transverse incision was made just to the left of midline.  The peritoneal cavity was entered under direct camera visualization using a 5  mm 0° laparoscope and an Optiview trocar.  The abdomen was then insufflated to a pressure of 8-12 mmHg with CO2 gas with AirSeal.  Exploratory laparoscopy revealed no evidence of injury from the entrance technique and no significant abnormalities unless addendum dictated below.  An angled laparoscope was then used.  The patient was placed in reverse Trendelenburg position.  Under direct camera visualization two 8 mm trocar was placed in the left lateral midabdominal position.  A 12 mm trocar was placed in the right abdomen.  A Leonardo retractor was placed through an epigastric incision and used to elevate the left lobe of the liver.      Next the 30 degree 8 mm scope was brought into the 8 mm port just to the left of the umbilicus after the corresponding trocar was docked to the da Tina robot.  Targeting then took place and then the rest of the trochars were docked to the robot and angled into position.  Instruments were brought in through the trochars in place for laparoscopic view.       I then took control of the surgical console. At this point, approximately shelter along the greater curvature, the gastrocolic omentum was divided with the Vessel Sealer and this proceeded superiorly to the angle of His taking down the short gastric vessels.  All posterior attachments of the lesser sac and posterior aspect of the stomach to the pancreas were taken down as well.      Dissection then proceeded medially taking down the greater curvature with the vessel sealer to just proximal to the pylorus.  The 38-Bolivian bougie was passed back down into the stomach to aid in  sizing the sleeve.       The right 12 mm intuitive trocar was removed and replaced with the 19 mm trocar.  The stomach was marked with indelible ink 1 cm from the esophagus, 3 cm at the incisura and approximately 4 to 5 cm from the pylorus.  The Titan stapler was advanced into the abdomen and placed into position and used to create the gastric sleeve.  The stapler was then removed after firing.    Insufflation of the stomach under water was performed and there was no evidence of leak.  An omentopexy was performed with absorbable suture.    The specimen was removed through the 19 mm port site.  The liver retractor was removed. The fascia at the 19 mm trocar site incision that was used for extraction was closed with a single 0 Vicryl suture in a figure-of-eight fashion placed under direct laparoscopic camera visualization with a suture passer and tying the knot extracorporeally.  The fascia in the area was infiltrated with local anesthesia. All incisions were then infiltrated with local anesthetic. The remaining trocars were removed under direct camera visualization with no bleeding noted from their sites.  The abdomen was desufflated of gas. The skin in each incision was closed using 4-0 antibiotic impregnated Monocryl in a subcuticular fashion followed by Dermabond.  The patient tolerated the procedure well without complication and was taken to the recovery room in stable condition.  All sponge, needle and instrument counts were correct.     The hiatus was checked for a hernia and no hernia was detected

## 2025-05-12 NOTE — SIGNIFICANT NOTE
"   05/12/25 0621   Peripheral IV 05/12/25 0621 20 G Anterior;Left Forearm   Placement date: If unknown, DO NOT use \"Add Comment\" note/Placement time: If unknown, DO NOT use \"Add Comment\" note: 05/12/25 0621   Hand Hygiene Completed: Yes  Size (Gauge): (c) 20 G  Orientation: Anterior;Left  Location: Forearm  Site Prep: Chlorhe...   Site Assessment Clean;Dry;Intact   Dressing Type Transparent   Line Status Flushed;Blood return noted;Infusing   Dressing Status Clean;Dry;Intact   Dressing Intervention New dressing     Ultrasound Inserted IV Site:LFA    Catheter Length:1.88in    Diameter:0.34cm    Depth:0.75cm      Vascular Access Score=5  1) Palpable / Visible / Dis  2) Palpable / Viasible / Not Distended  3) Easily Palpable / Not Visibile  4) Poorly Palpable / Visible  5) Poorly / Nonpalpable / NV   "

## 2025-05-12 NOTE — ANESTHESIA POSTPROCEDURE EVALUATION
Patient: Alice Adams    Procedure Summary       Date: 05/12/25 Room / Location:  [facility]  /  [facility]    Anesthesia Start: 0659 Anesthesia Stop: 0812    Procedure: Sleeve gastrectomy LAPAROSCOPIC OR ROBOTIC (Abdomen) Diagnosis:       Obesity, Class III, BMI 40-49.9 (morbid obesity)      (Obesity, Class III, BMI 40-49.9 (morbid obesity) [E66.01])    Surgeons: Nathaniel Hernandez Jr., MD Provider: Magdaleno Peralta MD    Anesthesia Type: general ASA Status: 3            Anesthesia Type: general    Vitals  Vitals Value Taken Time   /73 05/12/25 09:00   Temp 36.1 °C (97 °F) 05/12/25 08:12   Pulse 58 05/12/25 09:06   Resp 22 05/12/25 09:00   SpO2 98 % 05/12/25 09:06   Vitals shown include unfiled device data.        Post Anesthesia Care and Evaluation    Patient location during evaluation: PACU  Patient participation: complete - patient participated  Level of consciousness: awake  Pain management: adequate    Airway patency: patent  Anesthetic complications: No anesthetic complications  PONV Status: none  Cardiovascular status: acceptable  Respiratory status: acceptable  Hydration status: acceptable

## 2025-05-12 NOTE — ANESTHESIA PROCEDURE NOTES
Airway  Reason: elective    Date/Time: 5/12/2025 7:09 AM  Airway not difficult    General Information and Staff    Patient location during procedure: OR  Anesthesiologist: Magdaleno Peralta MD  CRNA/CAA: Mena Weiner CRNA    Indications and Patient Condition  Indications for airway management: airway protection    Preoxygenated: yes    Mask difficulty assessment: 1 - vent by mask    Final Airway Details    Final airway type: endotracheal airway      Successful airway: ETT  Cuffed: yes   Successful intubation technique: video laryngoscopy  Adjuncts used in placement: intubating stylet  Endotracheal tube insertion site: oral  Blade: CMAC  Blade size: D  ETT size (mm): 7.5  Cormack-Lehane Classification: grade I - full view of glottis  Placement verified by: chest auscultation and capnometry   Cuff volume (mL): 7  Measured from: teeth  ETT/EBT  to teeth (cm): 21  Number of attempts at approach: 1  Assessment: lips, teeth, and gum same as pre-op and atraumatic intubation    Additional Comments  Grade IIa view with direct laryngoscopy via CMAC. Grade I view with video laryngoscopy.

## 2025-05-12 NOTE — ANESTHESIA PREPROCEDURE EVALUATION
Anesthesia Evaluation     Patient summary reviewed and Nursing notes reviewed   NPO Solid Status: > 8 hours  NPO Liquid Status: > 2 hours           Airway   Mallampati: II  TM distance: >3 FB  Neck ROM: full  No difficulty expected  Dental - normal exam     Pulmonary - normal exam   (+) ,sleep apnea  Cardiovascular - normal exam  Exercise tolerance: good (4-7 METS)    (+) hypertension      Neuro/Psych  (+) psychiatric history  GI/Hepatic/Renal/Endo    (+) morbid obesity    Musculoskeletal (-) negative ROS    Abdominal    Substance History - negative use     OB/GYN negative ob/gyn ROS         Other                    Anesthesia Plan    ASA 3     general     intravenous induction     Anesthetic plan, risks, benefits, and alternatives have been provided, discussed and informed consent has been obtained with: patient.    CODE STATUS:

## 2025-05-13 ENCOUNTER — TRANSITIONAL CARE MANAGEMENT TELEPHONE ENCOUNTER (OUTPATIENT)
Dept: CALL CENTER | Facility: HOSPITAL | Age: 40
End: 2025-05-13
Payer: COMMERCIAL

## 2025-05-13 LAB
CYTO UR: NORMAL
LAB AP CASE REPORT: NORMAL
PATH REPORT.FINAL DX SPEC: NORMAL
PATH REPORT.GROSS SPEC: NORMAL

## 2025-05-13 NOTE — OUTREACH NOTE
Call Center TCM Note      Flowsheet Row Responses   [facility] patient discharged from? [place]   Does the patient have one of the following disease processes/diagnoses(primary or secondary)? General Surgery   TCM attempt successful? Yes   Call start time 1206   Call end time 1209   Discharge diagnosis Obesity, Class III, BMI 40-49.9 (morbid obesity)   Meds reviewed with patient/caregiver? Yes   Is the patient having any side effects they believe may be caused by any medication additions or changes? No   Does the patient have all medications related to this admission filled (includes all antibiotics, pain medications, etc.) Yes   Is the patient taking all medications as directed (includes completed medication regime)? Yes   Comments Pt has f/u with surgeon on 5/19   Does the patient have an appointment with their PCP within 7-14 days of discharge? No   Nursing Interventions Patient desires to follow up with specialty only   Psychosocial issues? No   Is the patient /caregiver able to teach back basic post-op care? Keep incision areas clean,dry and protected, Practice 'cough and deep breath', Continue use of incentive spirometry at least 1 week post discharge, Drive as instructed by MD in discharge instructions, No tub bath, swimming, or hot tub until instructed by MD, Lifting as instructed by MD in discharge instructions   Is the patient/caregiver able to teach back signs and symptoms of incisional infection? Increased redness, swelling or pain at the incisonal site, Increased drainage or bleeding, Incisional warmth, Pus or odor from incision, Fever   Is the patient/caregiver able to teach back steps to recovery at home? Set small, achievable goals for return to baseline health, Rest and rebuild strength, gradually increase activity, Eat a well-balance diet   If the patient is a current smoker, are they able to teach back resources for cessation? Not a smoker   Is the patient/caregiver able to teach back the  hierarchy of who to call/visit for symptoms/problems? PCP, Specialist, Home health nurse, Urgent Care, ED, 911 Yes   Additional teach back comments States she is doing well and following all instructions given to her by surgeon.   TCM call completed? Yes   Wrap up additional comments Denies questions or needs at this time.   Call end time 1208            Sanjana VENEGAS - Licensed Nurse    5/13/2025, 12:09 EDT

## 2025-05-20 ENCOUNTER — TELEPHONE (OUTPATIENT)
Dept: BARIATRICS/WEIGHT MGMT | Facility: CLINIC | Age: 40
End: 2025-05-20
Payer: COMMERCIAL

## 2025-05-20 NOTE — TELEPHONE ENCOUNTER
I left pt a voice mail checking on her and seeing how she is feeling? Also if she wants to see Heide this week we can make it work.

## 2025-05-27 ENCOUNTER — OFFICE VISIT (OUTPATIENT)
Dept: BARIATRICS/WEIGHT MGMT | Facility: CLINIC | Age: 40
End: 2025-05-27
Payer: COMMERCIAL

## 2025-05-27 ENCOUNTER — TELEPHONE (OUTPATIENT)
Dept: INTERNAL MEDICINE | Facility: CLINIC | Age: 40
End: 2025-05-27

## 2025-05-27 VITALS
SYSTOLIC BLOOD PRESSURE: 122 MMHG | WEIGHT: 293 LBS | TEMPERATURE: 97.3 F | HEIGHT: 68 IN | DIASTOLIC BLOOD PRESSURE: 83 MMHG | BODY MASS INDEX: 44.41 KG/M2 | HEART RATE: 66 BPM

## 2025-05-27 DIAGNOSIS — E66.01 OBESITY, CLASS III, BMI 40-49.9 (MORBID OBESITY): Primary | ICD-10-CM

## 2025-05-27 DIAGNOSIS — Z98.84 S/P LAPAROSCOPIC SLEEVE GASTRECTOMY: ICD-10-CM

## 2025-05-27 PROCEDURE — 99024 POSTOP FOLLOW-UP VISIT: CPT | Performed by: PHYSICIAN ASSISTANT

## 2025-05-27 RX ORDER — ONDANSETRON 4 MG/1
4 TABLET, ORALLY DISINTEGRATING ORAL EVERY 4 HOURS PRN
Qty: 30 TABLET | Refills: 0 | Status: SHIPPED | OUTPATIENT
Start: 2025-05-27

## 2025-05-27 NOTE — TELEPHONE ENCOUNTER
Caller: Alice Adams    Relationship to patient: Self    Best call back number:     Patient is needing: PATIENT STATES WHILE SHOWERING, SHE FELT A BREAST LUMP.  PATIENT WOULD LIKE A CALLBACK TO ADVISE HER IF SHE NEEDS TO SCHEDULE AN APPT TO SEE DR. NORMAN OR IF HE WANTS TO REFER HER TO SOMEONE TO HAVE IT EVALUATED.

## 2025-05-27 NOTE — PROGRESS NOTES
[facility] BARIATRIC SURGERY  [address]  [facility] [id number] [phone]  [address]  [facility] [id number] [phone]  Dept: [phone]  5/27/2025      Alice Adams.  [id number]  [id number]  1985  female      Chief Complaint   Patient presents with    Follow-up     2 week f/u sleeve       BH Post-Op Bariatric Surgery:   Alice Adams is status post laparopscopic Laparoscopic Sleeve procedure, performed on 05/12/2025.     HPI:   Today's weight is 133 kg (293 lb) pounds, today's BMI is Body mass index is 44.92 kg/m²., she   has a  loss of 29 pounds since the last visit and her    weight loss since surgery is 29 pounds. The patient reports a decreased portion size and loss of appetite.      Alice Adams denies  and reports 1 episode of emesis after taking medication and drinking lemon flavored water. Pt reports more mild when in car Pt reports 2 bms- 1 week apart and reports having to strain. Taking daily colace. Pt reports only urinating 3 times daily and reports urine volume is low. Denies any lower pain or discomfort near bladder. Denies starting urine stream or pain with urination.  The patient c/o appropriate post-op incisional discomfort that is improving.     The patient states they are drinking 50-60oz. Fluids and maybe getting 30-60g protein. Pt has advanced diet without issue.   Taking their vitamins, walking and using IS. Denies fevers, chills, chest pain or shortness of air.      Diet and Exercise: Diet history reviewed and discussed with the patient. Weight loss/gains to date discussed with the patient. No carbonated beverage consumption and exercising regularly- walking frequently.   Supplements: multivitamins, B-12, calcium, iron, B-1 and Vitamin D.   Patient is taking blood thinner as prescribed: Lovenox BID  Current outpatient and discharge medications have been reconciled for the patient.  Reviewed by: Heide  YANI Chowdary PA-C        Review of Systems   Constitutional:  Positive for fatigue. Negative for appetite change, fever and unexpected weight change.   HENT: Negative.     Eyes: Negative.    Respiratory: Negative.     Cardiovascular: Negative.  Negative for leg swelling.   Gastrointestinal:  Positive for constipation, nausea and vomiting. Negative for abdominal distention, abdominal pain and diarrhea.   Genitourinary:  Negative for difficulty urinating, frequency and urgency.   Musculoskeletal:  Negative for back pain.   Skin:  Positive for wound.   Psychiatric/Behavioral: Negative.     All other systems reviewed and are negative.      Patient Active Problem List   Diagnosis    ADD (attention deficit disorder)    Obesity, Class III, BMI 40-49.9 (morbid obesity)    Levator spasm    Pre-eclampsia in third trimester    Missed     Abnormal uterine bleeding    Chronic fatigue    Snoring    Infertility, female    Psoriasis    Pre-op evaluation    Dietary counseling    S/P laparoscopic sleeve gastrectomy       The following portions of the patient's history were reviewed and updated as appropriate: allergies, current medications, past medical history, past surgical history, and problem list.    Vitals:    25 1104   BP: 122/83   Pulse: 66   Temp: 97.3 °F (36.3 °C)       Physical Exam  Vitals reviewed.   Constitutional:       General: She is awake. She is not in acute distress.     Appearance: She is morbidly obese.   HENT:      Head: Normocephalic and atraumatic.      Mouth/Throat:      Mouth: Mucous membranes are moist.   Eyes:      General: No scleral icterus.     Pupils: Pupils are equal, round, and reactive to light.   Cardiovascular:      Rate and Rhythm: Normal rate and regular rhythm.      Heart sounds: Normal heart sounds.   Pulmonary:      Effort: Pulmonary effort is normal. No respiratory distress.      Breath sounds: Normal breath sounds.   Abdominal:      General: Abdomen is flat. Bowel sounds are  normal. There is no distension.      Palpations: Abdomen is soft.      Tenderness: There is no abdominal tenderness. There is no guarding.      Comments: Incisions clean, dry, intact; no erythema   Musculoskeletal:         General: Normal range of motion.      Cervical back: Normal range of motion and neck supple.   Skin:     General: Skin is warm and dry.   Neurological:      General: No focal deficit present.      Mental Status: She is alert and oriented to person, place, and time.   Psychiatric:         Mood and Affect: Mood normal.         Behavior: Behavior normal. Behavior is cooperative.         Assessment:   Post-op, the patient is is doing well.     Encounter Diagnoses   Name Primary?    Obesity, Class III, BMI 40-49.9 (morbid obesity) Yes    S/P laparoscopic sleeve gastrectomy        Plan:   Pt can stop Lovenox as she is up walking and doing well.  Advised pt to monitor for signs of calf tenderness, redness, swelling, pain or shortness of air.  Discussed diet phases with pt.  Add in miralax daily and then switch to fiber- metamucil.  Continue with current walking.  Work on increasing fluids. If urine output still low, call the office.  Work towards 70g protein daily.  Will refill zofran.    Reviewed with patient the importance of following the manual for diet progression. Increase activity as tolerated. Continue increasing daily intake of protein and water.   Return to work: the patient is to return to 3 weeks from their surgery date with no restrictions unless they develop medical problems in which we will see them back in the office. They received a note in our office today with their return to work date.  Activity restrictions: no lifting, pushing or pulling over 25lbs for 3 weeks.   Recommended patient be sure to get at least 70 grams of protein per day. Discussed with the patient the recommended amount of water per day to intake. Reviewed vitamin requirements. Be sure to do routine exercise and  increase activity as tolerated. No asa, nsaids or steroids for 8 weeks if gastric sleeve procedure and lifelong if gastric bypass procedure.. Patient may use miralax as needed if necessary.     Instructions / Recommendations: dietary counseling recommended, recommended a daily protein intake of  grams, vitamin supplement(s) recommended, recommended exercising consistently throughout the week, behavior modifications recommended and instructed to call the office for concerns, questions, or problems.     The patient was instructed to follow up at one month follow up appt.     The patient was counseled regarding post op bariatric manual

## 2025-05-30 ENCOUNTER — OFFICE VISIT (OUTPATIENT)
Dept: INTERNAL MEDICINE | Facility: CLINIC | Age: 40
End: 2025-05-30
Payer: COMMERCIAL

## 2025-05-30 ENCOUNTER — HOSPITAL ENCOUNTER (OUTPATIENT)
Dept: MAMMOGRAPHY | Facility: HOSPITAL | Age: 40
Discharge: HOME | End: 2025-05-30
Payer: COMMERCIAL

## 2025-05-30 ENCOUNTER — HOSPITAL ENCOUNTER (OUTPATIENT)
Dept: ULTRASOUND IMAGING | Facility: HOSPITAL | Age: 40
Discharge: HOME | End: 2025-05-30
Payer: COMMERCIAL

## 2025-05-30 VITALS
SYSTOLIC BLOOD PRESSURE: 118 MMHG | HEIGHT: 67 IN | OXYGEN SATURATION: 97 % | DIASTOLIC BLOOD PRESSURE: 78 MMHG | BODY MASS INDEX: 45.99 KG/M2 | WEIGHT: 293 LBS | HEART RATE: 71 BPM | RESPIRATION RATE: 16 BRPM

## 2025-05-30 DIAGNOSIS — N63.10 MASS OF RIGHT BREAST, UNSPECIFIED QUADRANT: ICD-10-CM

## 2025-05-30 DIAGNOSIS — N63.10 MASS OF RIGHT BREAST, UNSPECIFIED QUADRANT: Primary | ICD-10-CM

## 2025-05-30 PROCEDURE — 77066 DX MAMMO INCL CAD BI: CPT

## 2025-05-30 PROCEDURE — 76642 ULTRASOUND BREAST LIMITED: CPT

## 2025-05-30 PROCEDURE — G0279 TOMOSYNTHESIS, MAMMO: HCPCS

## 2025-05-30 NOTE — PROGRESS NOTES
"Chief Complaint  Breast Mass (Lump on RT breast--slightly painful x 1 week)    Subjective        Alice Adams presents to [facility]  Breast Mass    Here with breast mass on R lateral aspect of her breast  She has been on cycle, has had a little discomfort for the last week  No skin changes  No systemic symptoms    Objective   Vital Signs:  /78   Pulse 71   Resp 16   Ht 170.2 cm (67\")   Wt 133 kg (293 lb 12.8 oz)   SpO2 97%   BMI 46.02 kg/m²   Estimated body mass index is 46.02 kg/m² as calculated from the following:    Height as of this encounter: 170.2 cm (67\").    Weight as of this encounter: 133 kg (293 lb 12.8 oz).            Physical Exam  Vitals and nursing note reviewed.   Constitutional:       General: She is not in acute distress.     Appearance: Normal appearance.   HENT:      Head: Normocephalic and atraumatic.      Right Ear: External ear normal.      Left Ear: External ear normal.      Nose: Nose normal. No congestion.      Mouth/Throat:      Mouth: Mucous membranes are moist.      Pharynx: No oropharyngeal exudate or posterior oropharyngeal erythema.   Eyes:      Extraocular Movements: Extraocular movements intact.      Conjunctiva/sclera: Conjunctivae normal.      Pupils: Pupils are equal, round, and reactive to light.   Cardiovascular:      Rate and Rhythm: Normal rate and regular rhythm.      Pulses: Normal pulses.      Heart sounds: Normal heart sounds. No murmur heard.  Pulmonary:      Effort: Pulmonary effort is normal. No respiratory distress.      Breath sounds: Normal breath sounds. No wheezing or rales.   Musculoskeletal:      Cervical back: Normal range of motion.   Skin:     General: Skin is warm.      Capillary Refill: Capillary refill takes less than 2 seconds.      Comments: Small ovoid mass lateral aspect R breast   Neurological:      General: No focal deficit present.      Mental Status: She is alert and oriented to person, " place, and time. Mental status is at baseline.      Cranial Nerves: No cranial nerve deficit.   Psychiatric:         Mood and Affect: Mood normal.         Behavior: Behavior normal.         Thought Content: Thought content normal.        Result Review :  The following data was reviewed by: Toño Vaughan MD on 05/30/2025:  Common labs          4/24/2025    10:34   Common Labs   Glucose 71    BUN 11    Creatinine 0.77    Sodium 138    Potassium 4.4    Chloride 104    Calcium 9.2    Albumin 4.0    Total Bilirubin 0.2    Alkaline Phosphatase 125    AST (SGOT) 25    ALT (SGPT) 29    WBC 10.53    Hemoglobin 13.3    Hematocrit 39.6    Platelets 317      Data reviewed : prior office notes reviewed          Assessment and Plan   Diagnoses and all orders for this visit:    1. Mass of right breast, unspecified quadrant (Primary)   -     US Breast Bilateral Limited; Future  -     Mammo Diagnostic Digital Tomosynthesis Bilateral With CAD; Future  Right breast mass, appears benign but does have strong family history of breast cancer   We will proceed with imaging with diagnostic mammogram and ultrasound     Toño Vaughan MD  [facility]  [address] [place] [address]  Phone: [phone]

## 2025-06-01 ENCOUNTER — RESULTS FOLLOW-UP (OUTPATIENT)
Dept: INTERNAL MEDICINE | Facility: CLINIC | Age: 40
End: 2025-06-01
Payer: COMMERCIAL

## 2025-06-01 DIAGNOSIS — N64.89 BREAST ASYMMETRY: Primary | ICD-10-CM

## 2025-06-16 ENCOUNTER — OFFICE VISIT (OUTPATIENT)
Dept: BARIATRICS/WEIGHT MGMT | Facility: CLINIC | Age: 40
End: 2025-06-16
Payer: COMMERCIAL

## 2025-06-16 VITALS
DIASTOLIC BLOOD PRESSURE: 91 MMHG | HEART RATE: 61 BPM | WEIGHT: 287 LBS | TEMPERATURE: 97.3 F | BODY MASS INDEX: 45.04 KG/M2 | HEIGHT: 67 IN | SYSTOLIC BLOOD PRESSURE: 140 MMHG

## 2025-06-16 DIAGNOSIS — Z98.84 S/P LAPAROSCOPIC SLEEVE GASTRECTOMY: ICD-10-CM

## 2025-06-16 DIAGNOSIS — K59.00 CONSTIPATION, UNSPECIFIED CONSTIPATION TYPE: ICD-10-CM

## 2025-06-16 DIAGNOSIS — E66.01 OBESITY, CLASS III, BMI 40-49.9 (MORBID OBESITY): Primary | ICD-10-CM

## 2025-06-16 PROCEDURE — 99024 POSTOP FOLLOW-UP VISIT: CPT | Performed by: PHYSICIAN ASSISTANT

## 2025-06-16 RX ORDER — NITROFURANTOIN 25; 75 MG/1; MG/1
1 CAPSULE ORAL EVERY 12 HOURS SCHEDULED
COMMUNITY
Start: 2025-06-08

## 2025-06-16 NOTE — PROGRESS NOTES
[facility] BARIATRIC SURGERY  [address]  [facility] [id number] [phone]  [address]  [facility] [id number] [phone]  Dept: [phone]  6/16/2025      Alice Adams.  [id number]  [id number]  1985  female      Chief Complaint   Patient presents with    Post-op     1 month post op sleeve         BH Post-Op Bariatric Surgery:   Alice Adams is status post Laparoscopic Sleeve procedure, performed on 05/12/2025     HPI:       Today's weight is 130 kg (287 lb) pounds, today's BMI is Body mass index is 44.94 kg/m²., she has a loss of 6 pounds since the last visit and her  weight loss since surgery is 35 pounds. The patient reports a decreased portion size and loss of appetite.    Alice Adams denies d/regurg/reflux and reports episode of n/v after eating grilled chicken at [facility]. Pt reports constipation and had to use an enema last week and had multiple small pellet size bm. Pt concerned with slow weight loss. Pt weighs daily. Noticing some dizziness when moving from sitting to standing.     Diet and Exercise: Diet history reviewed and discussed with the patient. Weight loss/gains to date discussed with the patient. The patient states they are eating 60-70 grams of protein per day. She reports eating 3-4 meals per day, a typical portion size of 1 cup, eating 1-2 snacks per day, drinking 5+ or more 8-oz. glasses of water per day, no carbonated beverage consumption and exercising regularly-walks 5x/wk, gardening.     Supplements: bariatric multi, calcium.     Review of Systems   Constitutional:  Positive for appetite change. Negative for fatigue and unexpected weight change.   HENT: Negative.     Eyes: Negative.    Respiratory: Negative.     Cardiovascular: Negative.  Negative for leg swelling.   Gastrointestinal:  Positive for constipation, nausea and vomiting. Negative for abdominal distention, abdominal pain and  diarrhea.   Genitourinary:  Negative for difficulty urinating, frequency and urgency.   Musculoskeletal:  Negative for back pain.   Skin: Negative.    Neurological:  Positive for dizziness.   Psychiatric/Behavioral: Negative.     All other systems reviewed and are negative.      Patient Active Problem List   Diagnosis    ADD (attention deficit disorder)    Obesity, Class III, BMI 40-49.9 (morbid obesity)    Levator spasm    Pre-eclampsia in third trimester    Missed     Abnormal uterine bleeding    Chronic fatigue    Snoring    Infertility, female    Psoriasis    Pre-op evaluation    Dietary counseling    S/P laparoscopic sleeve gastrectomy    Constipation       Past Medical History:   Diagnosis Date    ADHD (attention deficit hyperactivity disorder)     Anemia  and     postpartum    Depression     Frequent UTI     Infertility, female 2025    Miscarriage     Obesity     PMS (premenstrual syndrome) normal    Sleep apnea     NO  MACHINE    Varicella as a child very severe       The following portions of the patient's history were reviewed and updated as appropriate: allergies, current medications, past medical history, past surgical history, and problem list.    Vitals:    25 1432   BP: 140/91   Pulse: 61   Temp: 97.3 °F (36.3 °C)       Physical Exam  Vitals reviewed.   Constitutional:       Appearance: Normal appearance.   HENT:      Head: Normocephalic and atraumatic.   Eyes:      Pupils: Pupils are equal, round, and reactive to light.   Cardiovascular:      Rate and Rhythm: Normal rate.   Pulmonary:      Effort: Pulmonary effort is normal. No respiratory distress.   Musculoskeletal:         General: Normal range of motion.      Cervical back: Normal range of motion and neck supple.   Neurological:      General: No focal deficit present.      Mental Status: She is alert and oriented to person, place, and time.   Psychiatric:         Mood and Affect: Mood normal.         Behavior: Behavior  normal.         Assessment:   Post-op, the patient is doing well, but frustrated that her weight loss has slowed down.     Encounter Diagnoses   Name Primary?    Obesity, Class III, BMI 40-49.9 (morbid obesity) Yes    S/P laparoscopic sleeve gastrectomy     Constipation, unspecified constipation type        Plan:   Diagnoses and all orders for this visit:    1. Obesity, Class III, BMI 40-49.9 (morbid obesity) (Primary)  -     CBC & Differential; Future  -     Comprehensive Metabolic Panel; Future  -     Ferritin; Future  -     Folate; Future  -     Iron; Future  -     Methylmalonic Acid, Serum; Future  -     Prealbumin; Future  -     Vitamin B1, Whole Blood; Future  -     Vitamin D,25-Hydroxy; Future    2. S/P laparoscopic sleeve gastrectomy  -     CBC & Differential; Future  -     Comprehensive Metabolic Panel; Future  -     Ferritin; Future  -     Folate; Future  -     Iron; Future  -     Methylmalonic Acid, Serum; Future  -     Prealbumin; Future  -     Vitamin B1, Whole Blood; Future  -     Vitamin D,25-Hydroxy; Future    3. Constipation, unspecified constipation type    Will get 1 month labs.  Add in metamucil daily or fiber gummies.  Discussed with the pt not weight daily.  Discussed overall trend of weight loss.  Focus on tracking protein intake and consistent exercise. Add in strength training.    Encouraged patient to be sure to get plenty of lean protein per day through small frequent meals all with a protein source.   Activity restrictions: none.   Recommended patient be sure to get at least 70 grams of protein per day by eating small, frequent meals all with high lean protein choices. Be sure to limit/cut back on daily carbohydrate intake. Discussed with the patient the recommended amount of water per day to intake. Reviewed vitamin requirements. Be sure to do routine exercise consistently throughout the week, including both cardio and strength training.     Instructions / Recommendations: dietary  counseling recommended, recommended a daily protein intake of  grams, vitamin supplement(s) recommended, recommended exercising consistently throughout the week, behavior modifications recommended and instructed to call the office for concerns, questions, or problems.     The patient was instructed to follow up in 2 months .     The patient was counseled regarding the above procedure. Total time spent during this encounter today was 20 minutes including reviewing previous notes, lab results and face to face time spent with the patient.  The majority of time was spent counseling the patient regarding diet and exercise as well as reviewing their medications and their compliance with the procedure.

## 2025-06-25 ENCOUNTER — LAB (OUTPATIENT)
Dept: LAB | Facility: HOSPITAL | Age: 40
End: 2025-06-25
Payer: COMMERCIAL

## 2025-06-25 DIAGNOSIS — E66.813 OBESITY, CLASS III, BMI 40-49.9 (MORBID OBESITY): ICD-10-CM

## 2025-06-25 DIAGNOSIS — Z98.84 S/P LAPAROSCOPIC SLEEVE GASTRECTOMY: ICD-10-CM

## 2025-06-25 LAB
25(OH)D3 SERPL-MCNC: 32.9 NG/ML (ref 30–100)
ALBUMIN SERPL-MCNC: 3.9 G/DL (ref 3.5–5.2)
ALBUMIN/GLOB SERPL: 1.6 G/DL
ALP SERPL-CCNC: 98 U/L (ref 39–117)
ALT SERPL W P-5'-P-CCNC: 16 U/L (ref 1–33)
ANION GAP SERPL CALCULATED.3IONS-SCNC: 13 MMOL/L (ref 5–15)
AST SERPL-CCNC: 13 U/L (ref 1–32)
BASOPHILS # BLD AUTO: 0.02 10*3/MM3 (ref 0–0.2)
BASOPHILS NFR BLD AUTO: 0.3 % (ref 0–1.5)
BILIRUB SERPL-MCNC: 0.3 MG/DL (ref 0–1.2)
BUN SERPL-MCNC: 6 MG/DL (ref 6–20)
BUN/CREAT SERPL: 9.4 (ref 7–25)
CALCIUM SPEC-SCNC: 9 MG/DL (ref 8.6–10.5)
CHLORIDE SERPL-SCNC: 106 MMOL/L (ref 98–107)
CO2 SERPL-SCNC: 24 MMOL/L (ref 22–29)
CREAT SERPL-MCNC: 0.64 MG/DL (ref 0.57–1)
DEPRECATED RDW RBC AUTO: 49.8 FL (ref 37–54)
EGFRCR SERPLBLD CKD-EPI 2021: 116.9 ML/MIN/1.73
EOSINOPHIL # BLD AUTO: 0.16 10*3/MM3 (ref 0–0.4)
EOSINOPHIL NFR BLD AUTO: 2.3 % (ref 0.3–6.2)
ERYTHROCYTE [DISTWIDTH] IN BLOOD BY AUTOMATED COUNT: 15.7 % (ref 12.3–15.4)
FERRITIN SERPL-MCNC: 60.8 NG/ML (ref 13–150)
FOLATE SERPL-MCNC: 14.9 NG/ML (ref 4.78–24.2)
GLOBULIN UR ELPH-MCNC: 2.5 GM/DL
GLUCOSE SERPL-MCNC: 90 MG/DL (ref 65–99)
HCT VFR BLD AUTO: 41.9 % (ref 34–46.6)
HGB BLD-MCNC: 13.5 G/DL (ref 12–15.9)
IMM GRANULOCYTES # BLD AUTO: 0.02 10*3/MM3 (ref 0–0.05)
IMM GRANULOCYTES NFR BLD AUTO: 0.3 % (ref 0–0.5)
IRON 24H UR-MRATE: 39 MCG/DL (ref 37–145)
LYMPHOCYTES # BLD AUTO: 1.9 10*3/MM3 (ref 0.7–3.1)
LYMPHOCYTES NFR BLD AUTO: 26.8 % (ref 19.6–45.3)
MCH RBC QN AUTO: 28 PG (ref 26.6–33)
MCHC RBC AUTO-ENTMCNC: 32.2 G/DL (ref 31.5–35.7)
MCV RBC AUTO: 86.9 FL (ref 79–97)
MONOCYTES # BLD AUTO: 0.5 10*3/MM3 (ref 0.1–0.9)
MONOCYTES NFR BLD AUTO: 7.1 % (ref 5–12)
NEUTROPHILS NFR BLD AUTO: 4.48 10*3/MM3 (ref 1.7–7)
NEUTROPHILS NFR BLD AUTO: 63.2 % (ref 42.7–76)
NRBC BLD AUTO-RTO: 0 /100 WBC (ref 0–0.2)
PLATELET # BLD AUTO: 273 10*3/MM3 (ref 140–450)
PMV BLD AUTO: 11.7 FL (ref 6–12)
POTASSIUM SERPL-SCNC: 3.8 MMOL/L (ref 3.5–5.2)
PREALB SERPL-MCNC: 18.8 MG/DL (ref 20–40)
PROT SERPL-MCNC: 6.4 G/DL (ref 6–8.5)
RBC # BLD AUTO: 4.82 10*6/MM3 (ref 3.77–5.28)
SODIUM SERPL-SCNC: 143 MMOL/L (ref 136–145)
WBC NRBC COR # BLD AUTO: 7.08 10*3/MM3 (ref 3.4–10.8)

## 2025-06-25 PROCEDURE — 85025 COMPLETE CBC W/AUTO DIFF WBC: CPT

## 2025-06-25 PROCEDURE — 84425 ASSAY OF VITAMIN B-1: CPT

## 2025-06-25 PROCEDURE — 36415 COLL VENOUS BLD VENIPUNCTURE: CPT

## 2025-06-25 PROCEDURE — 82306 VITAMIN D 25 HYDROXY: CPT

## 2025-06-25 PROCEDURE — 83921 ORGANIC ACID SINGLE QUANT: CPT

## 2025-06-25 PROCEDURE — 84134 ASSAY OF PREALBUMIN: CPT

## 2025-06-25 PROCEDURE — 83540 ASSAY OF IRON: CPT

## 2025-06-25 PROCEDURE — 80053 COMPREHEN METABOLIC PANEL: CPT

## 2025-06-25 PROCEDURE — 82746 ASSAY OF FOLIC ACID SERUM: CPT

## 2025-06-25 PROCEDURE — 82728 ASSAY OF FERRITIN: CPT

## 2025-06-28 LAB — VIT B1 BLD-SCNC: 128.3 NMOL/L (ref 66.5–200)

## 2025-06-29 LAB — METHYLMALONATE SERPL-SCNC: 70 NMOL/L (ref 0–378)

## 2025-07-25 RX ORDER — ONDANSETRON 4 MG/1
4 TABLET, ORALLY DISINTEGRATING ORAL EVERY 4 HOURS PRN
Qty: 30 TABLET | Refills: 1 | Status: SHIPPED | OUTPATIENT
Start: 2025-07-25

## 2025-08-18 ENCOUNTER — OFFICE VISIT (OUTPATIENT)
Dept: BARIATRICS/WEIGHT MGMT | Facility: CLINIC | Age: 40
End: 2025-08-18
Payer: COMMERCIAL

## 2025-08-18 VITALS
TEMPERATURE: 98.1 F | BODY MASS INDEX: 41.91 KG/M2 | WEIGHT: 267 LBS | DIASTOLIC BLOOD PRESSURE: 75 MMHG | SYSTOLIC BLOOD PRESSURE: 128 MMHG | HEART RATE: 77 BPM | HEIGHT: 67 IN

## 2025-08-18 DIAGNOSIS — K59.00 CONSTIPATION, UNSPECIFIED CONSTIPATION TYPE: ICD-10-CM

## 2025-08-18 DIAGNOSIS — E66.813 OBESITY, CLASS III, BMI 40-49.9 (MORBID OBESITY): Primary | ICD-10-CM

## 2025-08-18 DIAGNOSIS — Z98.84 S/P LAPAROSCOPIC SLEEVE GASTRECTOMY: ICD-10-CM

## 2025-08-18 RX ORDER — ONDANSETRON 4 MG/1
4 TABLET, ORALLY DISINTEGRATING ORAL EVERY 4 HOURS PRN
Qty: 30 TABLET | Refills: 1 | Status: SHIPPED | OUTPATIENT
Start: 2025-08-18

## (undated) DEVICE — KT ORCA ORCAPOD DISP STRL

## (undated) DEVICE — BALN BOUGIE STD/TPR 38F

## (undated) DEVICE — ST TBG AIRSEAL BIF FLTR W/ACT/CHARCOAL/FLTR

## (undated) DEVICE — LAPAROSCOPIC DISSECTOR: Brand: DEROYAL

## (undated) DEVICE — MSK PROC CURAPLEX O2 2/ADAPT 7FT

## (undated) DEVICE — GLV SURG SENSICARE POLYISPRN W/ALOE PF LF 6.5 GRN STRL

## (undated) DEVICE — TUBING, SUCTION, 1/4" X 10', STRAIGHT: Brand: MEDLINE

## (undated) DEVICE — BLCK/BITE BLOX W/DENTL/RIM W/STRAP 54F

## (undated) DEVICE — SYRINGE,TOOMEY,IRRIGATION,70CC,STERILE: Brand: MEDLINE

## (undated) DEVICE — SUT SILK 0 SH 30IN K834H

## (undated) DEVICE — SUT MNCRYL PLS ANTIB UD 4/0 PS2 18IN

## (undated) DEVICE — LN SMPL CO2 SHTRM SD STREAM W/M LUER

## (undated) DEVICE — SEAL

## (undated) DEVICE — SOL IRR SALINE BO 0.9% 100ML STRL

## (undated) DEVICE — THE DEVICE IS A DISPOSABLE, LIGATURE PASSING, SUTURING APPARATUS AND NEEDLE GUIDE FOR THE ABDOMINAL WALL WHICH IS NON-POWERED, HAND-HELD, AND HAND-MANIPULATED,INTENDED TO BE USED IN VARIOUS GENERAL SURGICAL PROCEDURES. THE DEVICE INCLUDES A LIGATURE CARRIER PATHWAY, NEEDLE GUIDE, TWO NEEDLES, REFERENCE PLANE T-BAR, AND A GUIDEWIRE. THE HANDLE OF THE DEVICE PROVIDES TWO DIAMETRICALLY OPPOSED ENCLOSED GUIDEWAYS FOR THE ADVANCEMENT AND RETRACTION OF THE NEEDLES UNDER MANUAL CONTROL OF A PLUNGER LOCATED AT THE PROXIMAL END OF THE DEVICE.AS PART OF THE M-CLOSE CONVENIENCE KIT, A NERVE BLOCK NEEDLE IS INCLUDED FOR THE ADMINISTRATION OF LOCAL ANESTHETIC AGENTS TO PROVIDE REGIONAL AND LOCAL ANESTHESIA.  A TELFA ANTIMICROBIAL, NON-ADHERENT PAD IS ALSO PROVIDED IN THE KIT FOR USE AS A PRIMARY DRESSING FOR THE SURGICAL INCISION.: Brand: M-CLOSE KIT

## (undated) DEVICE — GLV SURG SENSICARE PI PF LF 8.5 GRN STRL

## (undated) DEVICE — BLADELESS OBTURATOR: Brand: WECK VISTA

## (undated) DEVICE — DECANTER BAG 9": Brand: MEDLINE INDUSTRIES, INC.

## (undated) DEVICE — GLV SURG SENSICARE PI MIC PF SZ8.5 LF STRL

## (undated) DEVICE — SUT VIC 0 CT1 27IN DYED J340H

## (undated) DEVICE — NDL HYPO PRECISIONGLIDE REG 21G 1 1/2

## (undated) DEVICE — OSC ROBOT BARIATRIC: Brand: MEDLINE INDUSTRIES, INC.

## (undated) DEVICE — LAPAROVUE VISIBILITY SYSTEM LAPAROSCOPIC SOLUTIONS: Brand: LAPAROVUE

## (undated) DEVICE — ADAPT CLN BIOGUARD AIR/H2O DISP

## (undated) DEVICE — VIOLET BRAIDED (POLYGLACTIN 910), SYNTHETIC ABSORBABLE SUTURE: Brand: COATED VICRYL

## (undated) DEVICE — FRCP BX RADJAW4 NDL 2.8 240CM LG OG BX40

## (undated) DEVICE — PATIENT RETURN ELECTRODE, SINGLE-USE, CONTACT QUALITY MONITORING, ADULT, WITH 9FT CORD, FOR PATIENTS WEIGING OVER 33LBS. (15KG): Brand: MEGADYNE

## (undated) DEVICE — GLV SURG SENSICARE PI MIC PF SZ6 LF STRL

## (undated) DEVICE — BLCK/BITE BLOX WO/DENTL/RIM W/STRAP 54F

## (undated) DEVICE — SENSR O2 OXIMAX FNGR A/ 18IN NONSTR

## (undated) DEVICE — VESSEL SEALER EXTEND: Brand: ENDOWRIST

## (undated) DEVICE — TROC BLADLES AIRSEAL/OPTI THRD 8X120MM 1P/U

## (undated) DEVICE — MARKER,SKIN,WI/RULER AND LABELS: Brand: MEDLINE

## (undated) DEVICE — CONN TBG Y 5 IN 1 LF STRL

## (undated) DEVICE — TROC STANDARDTROCAR FOR/TITANSGS 19MM DISP STRL

## (undated) DEVICE — ARM DRAPE